# Patient Record
Sex: MALE | Race: WHITE | NOT HISPANIC OR LATINO | Employment: FULL TIME | URBAN - METROPOLITAN AREA
[De-identification: names, ages, dates, MRNs, and addresses within clinical notes are randomized per-mention and may not be internally consistent; named-entity substitution may affect disease eponyms.]

---

## 2017-11-09 ENCOUNTER — IMMUNIZATION (OUTPATIENT)
Dept: OCCUPATIONAL MEDICINE | Facility: CLINIC | Age: 47
End: 2017-11-09

## 2017-11-09 DIAGNOSIS — Z23 NEED FOR VACCINATION: ICD-10-CM

## 2017-11-09 PROCEDURE — 90686 IIV4 VACC NO PRSV 0.5 ML IM: CPT | Performed by: PREVENTIVE MEDICINE

## 2018-10-01 ENCOUNTER — IMMUNIZATION (OUTPATIENT)
Dept: OCCUPATIONAL MEDICINE | Facility: CLINIC | Age: 48
End: 2018-10-01

## 2018-10-01 DIAGNOSIS — Z23 NEED FOR VACCINATION: ICD-10-CM

## 2018-10-01 PROCEDURE — 90686 IIV4 VACC NO PRSV 0.5 ML IM: CPT | Performed by: PREVENTIVE MEDICINE

## 2018-11-20 PROBLEM — M76.899 BICEPS FEMORIS TENDONITIS: Status: ACTIVE | Noted: 2018-11-20

## 2019-02-26 ENCOUNTER — HOSPITAL ENCOUNTER (OUTPATIENT)
Dept: RADIOLOGY | Facility: MEDICAL CENTER | Age: 49
End: 2019-02-26
Attending: PHYSICIAN ASSISTANT
Payer: COMMERCIAL

## 2019-02-26 DIAGNOSIS — M17.11 OSTEOARTHRITIS OF RIGHT KNEE, UNSPECIFIED OSTEOARTHRITIS TYPE: ICD-10-CM

## 2019-02-26 PROCEDURE — 73700 CT LOWER EXTREMITY W/O DYE: CPT | Mod: RT

## 2019-04-23 DIAGNOSIS — Z01.812 PRE-OPERATIVE LABORATORY EXAMINATION: ICD-10-CM

## 2019-04-23 DIAGNOSIS — Z01.810 PRE-OPERATIVE CARDIOVASCULAR EXAMINATION: ICD-10-CM

## 2019-04-23 LAB
BASOPHILS # BLD AUTO: 0.6 % (ref 0–1.8)
BASOPHILS # BLD: 0.03 K/UL (ref 0–0.12)
EKG IMPRESSION: NORMAL
EOSINOPHIL # BLD AUTO: 0.1 K/UL (ref 0–0.51)
EOSINOPHIL NFR BLD: 2 % (ref 0–6.9)
ERYTHROCYTE [DISTWIDTH] IN BLOOD BY AUTOMATED COUNT: 44.1 FL (ref 35.9–50)
HCT VFR BLD AUTO: 45.9 % (ref 42–52)
HGB BLD-MCNC: 15.3 G/DL (ref 14–18)
HIV 1+2 AB+HIV1 P24 AG SERPL QL IA: NON REACTIVE
IMM GRANULOCYTES # BLD AUTO: 0.01 K/UL (ref 0–0.11)
IMM GRANULOCYTES NFR BLD AUTO: 0.2 % (ref 0–0.9)
LYMPHOCYTES # BLD AUTO: 2.34 K/UL (ref 1–4.8)
LYMPHOCYTES NFR BLD: 47.6 % (ref 22–41)
MCH RBC QN AUTO: 30.7 PG (ref 27–33)
MCHC RBC AUTO-ENTMCNC: 33.3 G/DL (ref 33.7–35.3)
MCV RBC AUTO: 92.2 FL (ref 81.4–97.8)
MONOCYTES # BLD AUTO: 0.47 K/UL (ref 0–0.85)
MONOCYTES NFR BLD AUTO: 9.6 % (ref 0–13.4)
NEUTROPHILS # BLD AUTO: 1.97 K/UL (ref 1.82–7.42)
NEUTROPHILS NFR BLD: 40 % (ref 44–72)
NRBC # BLD AUTO: 0 K/UL
NRBC BLD-RTO: 0 /100 WBC
PLATELET # BLD AUTO: 215 K/UL (ref 164–446)
PMV BLD AUTO: 9.8 FL (ref 9–12.9)
RBC # BLD AUTO: 4.98 M/UL (ref 4.7–6.1)
SCCMEC + MECA PNL NOSE NAA+PROBE: NEGATIVE
SCCMEC + MECA PNL NOSE NAA+PROBE: NEGATIVE
WBC # BLD AUTO: 4.9 K/UL (ref 4.8–10.8)

## 2019-04-23 PROCEDURE — 87389 HIV-1 AG W/HIV-1&-2 AB AG IA: CPT

## 2019-04-23 PROCEDURE — 87640 STAPH A DNA AMP PROBE: CPT

## 2019-04-23 PROCEDURE — 85025 COMPLETE CBC W/AUTO DIFF WBC: CPT

## 2019-04-23 PROCEDURE — 80048 BASIC METABOLIC PNL TOTAL CA: CPT

## 2019-04-23 PROCEDURE — 36415 COLL VENOUS BLD VENIPUNCTURE: CPT

## 2019-04-23 PROCEDURE — 93010 ELECTROCARDIOGRAM REPORT: CPT | Performed by: INTERNAL MEDICINE

## 2019-04-23 PROCEDURE — 93005 ELECTROCARDIOGRAM TRACING: CPT

## 2019-04-23 PROCEDURE — 87641 MR-STAPH DNA AMP PROBE: CPT

## 2019-04-23 RX ORDER — IBUPROFEN 200 MG
600 TABLET ORAL EVERY 6 HOURS PRN
COMMUNITY
End: 2020-05-29

## 2019-04-24 NOTE — OR NURSING
BMP not received by lab. Spoke with Dr. Teofilo Medellin MA. She stated it was OK to draw day of surgery.

## 2019-04-25 LAB
ANION GAP SERPL CALC-SCNC: 14 MMOL/L (ref 0–11.9)
BUN SERPL-MCNC: 22 MG/DL (ref 8–22)
CALCIUM SERPL-MCNC: 9.6 MG/DL (ref 8.5–10.5)
CHLORIDE SERPL-SCNC: 104 MMOL/L (ref 96–112)
CO2 SERPL-SCNC: 22 MMOL/L (ref 20–33)
CREAT SERPL-MCNC: 1.1 MG/DL (ref 0.5–1.4)
GLUCOSE SERPL-MCNC: 99 MG/DL (ref 65–99)
POTASSIUM SERPL-SCNC: 4.2 MMOL/L (ref 3.6–5.5)
SODIUM SERPL-SCNC: 140 MMOL/L (ref 135–145)

## 2019-05-09 ENCOUNTER — ANESTHESIA (OUTPATIENT)
Dept: SURGERY | Facility: MEDICAL CENTER | Age: 49
End: 2019-05-09
Payer: COMMERCIAL

## 2019-05-09 ENCOUNTER — ANESTHESIA EVENT (OUTPATIENT)
Dept: SURGERY | Facility: MEDICAL CENTER | Age: 49
End: 2019-05-09
Payer: COMMERCIAL

## 2019-05-09 ENCOUNTER — HOSPITAL ENCOUNTER (OUTPATIENT)
Facility: MEDICAL CENTER | Age: 49
End: 2019-05-09
Attending: ORTHOPAEDIC SURGERY | Admitting: ORTHOPAEDIC SURGERY
Payer: COMMERCIAL

## 2019-05-09 VITALS
BODY MASS INDEX: 31.61 KG/M2 | HEIGHT: 73 IN | WEIGHT: 238.54 LBS | RESPIRATION RATE: 17 BRPM | HEART RATE: 83 BPM | TEMPERATURE: 97.9 F | SYSTOLIC BLOOD PRESSURE: 121 MMHG | OXYGEN SATURATION: 99 % | DIASTOLIC BLOOD PRESSURE: 75 MMHG

## 2019-05-09 DIAGNOSIS — Z96.651 S/P TKR (TOTAL KNEE REPLACEMENT) USING CEMENT, RIGHT: ICD-10-CM

## 2019-05-09 PROCEDURE — A9270 NON-COVERED ITEM OR SERVICE: HCPCS | Performed by: ORTHOPAEDIC SURGERY

## 2019-05-09 PROCEDURE — A9270 NON-COVERED ITEM OR SERVICE: HCPCS | Performed by: STUDENT IN AN ORGANIZED HEALTH CARE EDUCATION/TRAINING PROGRAM

## 2019-05-09 PROCEDURE — 160002 HCHG RECOVERY MINUTES (STAT): Performed by: ORTHOPAEDIC SURGERY

## 2019-05-09 PROCEDURE — 700111 HCHG RX REV CODE 636 W/ 250 OVERRIDE (IP): Performed by: ANESTHESIOLOGY

## 2019-05-09 PROCEDURE — 97161 PT EVAL LOW COMPLEX 20 MIN: CPT

## 2019-05-09 PROCEDURE — 700102 HCHG RX REV CODE 250 W/ 637 OVERRIDE(OP): Performed by: STUDENT IN AN ORGANIZED HEALTH CARE EDUCATION/TRAINING PROGRAM

## 2019-05-09 PROCEDURE — 160048 HCHG OR STATISTICAL LEVEL 1-5: Performed by: ORTHOPAEDIC SURGERY

## 2019-05-09 PROCEDURE — 160009 HCHG ANES TIME/MIN: Performed by: ORTHOPAEDIC SURGERY

## 2019-05-09 PROCEDURE — 502240 HCHG MISC OR SUPPLY RC 0272: Performed by: ORTHOPAEDIC SURGERY

## 2019-05-09 PROCEDURE — 97165 OT EVAL LOW COMPLEX 30 MIN: CPT

## 2019-05-09 PROCEDURE — A9270 NON-COVERED ITEM OR SERVICE: HCPCS | Performed by: ANESTHESIOLOGY

## 2019-05-09 PROCEDURE — 700101 HCHG RX REV CODE 250: Performed by: ORTHOPAEDIC SURGERY

## 2019-05-09 PROCEDURE — 700105 HCHG RX REV CODE 258: Performed by: STUDENT IN AN ORGANIZED HEALTH CARE EDUCATION/TRAINING PROGRAM

## 2019-05-09 PROCEDURE — 160036 HCHG PACU - EA ADDL 30 MINS PHASE I: Performed by: ORTHOPAEDIC SURGERY

## 2019-05-09 PROCEDURE — 700102 HCHG RX REV CODE 250 W/ 637 OVERRIDE(OP): Performed by: ORTHOPAEDIC SURGERY

## 2019-05-09 PROCEDURE — 502000 HCHG MISC OR IMPLANTS RC 0278: Performed by: ORTHOPAEDIC SURGERY

## 2019-05-09 PROCEDURE — 96374 THER/PROPH/DIAG INJ IV PUSH: CPT

## 2019-05-09 PROCEDURE — G0378 HOSPITAL OBSERVATION PER HR: HCPCS

## 2019-05-09 PROCEDURE — 160022 HCHG BLOCK: Performed by: ORTHOPAEDIC SURGERY

## 2019-05-09 PROCEDURE — 160041 HCHG SURGERY MINUTES - EA ADDL 1 MIN LEVEL 4: Performed by: ORTHOPAEDIC SURGERY

## 2019-05-09 PROCEDURE — L8699 PROSTHETIC IMPLANT NOS: HCPCS | Performed by: ORTHOPAEDIC SURGERY

## 2019-05-09 PROCEDURE — 500002 HCHG ADHESIVE, DERMABOND: Performed by: ORTHOPAEDIC SURGERY

## 2019-05-09 PROCEDURE — 700111 HCHG RX REV CODE 636 W/ 250 OVERRIDE (IP): Performed by: STUDENT IN AN ORGANIZED HEALTH CARE EDUCATION/TRAINING PROGRAM

## 2019-05-09 PROCEDURE — 160029 HCHG SURGERY MINUTES - 1ST 30 MINS LEVEL 4: Performed by: ORTHOPAEDIC SURGERY

## 2019-05-09 PROCEDURE — 160035 HCHG PACU - 1ST 60 MINS PHASE I: Performed by: ORTHOPAEDIC SURGERY

## 2019-05-09 PROCEDURE — 700101 HCHG RX REV CODE 250: Performed by: STUDENT IN AN ORGANIZED HEALTH CARE EDUCATION/TRAINING PROGRAM

## 2019-05-09 PROCEDURE — 700105 HCHG RX REV CODE 258: Performed by: ORTHOPAEDIC SURGERY

## 2019-05-09 PROCEDURE — 700105 HCHG RX REV CODE 258: Performed by: ANESTHESIOLOGY

## 2019-05-09 PROCEDURE — 700101 HCHG RX REV CODE 250: Performed by: ANESTHESIOLOGY

## 2019-05-09 PROCEDURE — 502579 HCHG PACK, TOTAL KNEE: Performed by: ORTHOPAEDIC SURGERY

## 2019-05-09 PROCEDURE — 501838 HCHG SUTURE GENERAL: Performed by: ORTHOPAEDIC SURGERY

## 2019-05-09 PROCEDURE — 700102 HCHG RX REV CODE 250 W/ 637 OVERRIDE(OP): Performed by: ANESTHESIOLOGY

## 2019-05-09 DEVICE — BONE CEMENT SIMPLEX FULL DOSE - (10EA/PK): Type: IMPLANTABLE DEVICE | Site: KNEE | Status: FUNCTIONAL

## 2019-05-09 DEVICE — IMPLANTABLE DEVICE: Type: IMPLANTABLE DEVICE | Site: KNEE | Status: FUNCTIONAL

## 2019-05-09 RX ORDER — DIPHENHYDRAMINE HYDROCHLORIDE 50 MG/ML
25 INJECTION INTRAMUSCULAR; INTRAVENOUS EVERY 6 HOURS PRN
Status: DISCONTINUED | OUTPATIENT
Start: 2019-05-09 | End: 2019-05-09 | Stop reason: HOSPADM

## 2019-05-09 RX ORDER — BUPIVACAINE HYDROCHLORIDE 2.5 MG/ML
INJECTION, SOLUTION EPIDURAL; INFILTRATION; INTRACAUDAL PRN
Status: DISCONTINUED | OUTPATIENT
Start: 2019-05-09 | End: 2019-05-09 | Stop reason: SURG

## 2019-05-09 RX ORDER — ACETAMINOPHEN 500 MG
1000 TABLET ORAL EVERY 6 HOURS
Status: DISCONTINUED | OUTPATIENT
Start: 2019-05-09 | End: 2019-05-09 | Stop reason: HOSPADM

## 2019-05-09 RX ORDER — AMOXICILLIN 250 MG
1 CAPSULE ORAL NIGHTLY
Status: DISCONTINUED | OUTPATIENT
Start: 2019-05-09 | End: 2019-05-09 | Stop reason: HOSPADM

## 2019-05-09 RX ORDER — OXYCODONE HYDROCHLORIDE 10 MG/1
10 TABLET ORAL EVERY 4 HOURS PRN
Status: DISCONTINUED | OUTPATIENT
Start: 2019-05-09 | End: 2019-05-09 | Stop reason: HOSPADM

## 2019-05-09 RX ORDER — SCOLOPAMINE TRANSDERMAL SYSTEM 1 MG/1
1 PATCH, EXTENDED RELEASE TRANSDERMAL
Status: DISCONTINUED | OUTPATIENT
Start: 2019-05-09 | End: 2019-05-09 | Stop reason: HOSPADM

## 2019-05-09 RX ORDER — SODIUM CHLORIDE, SODIUM LACTATE, POTASSIUM CHLORIDE, CALCIUM CHLORIDE 600; 310; 30; 20 MG/100ML; MG/100ML; MG/100ML; MG/100ML
INJECTION, SOLUTION INTRAVENOUS CONTINUOUS
Status: DISCONTINUED | OUTPATIENT
Start: 2019-05-09 | End: 2019-05-09 | Stop reason: HOSPADM

## 2019-05-09 RX ORDER — HALOPERIDOL 5 MG/ML
1 INJECTION INTRAMUSCULAR EVERY 6 HOURS PRN
Status: DISCONTINUED | OUTPATIENT
Start: 2019-05-09 | End: 2019-05-09 | Stop reason: HOSPADM

## 2019-05-09 RX ORDER — POLYETHYLENE GLYCOL 3350 17 G/17G
1 POWDER, FOR SOLUTION ORAL 2 TIMES DAILY PRN
Status: DISCONTINUED | OUTPATIENT
Start: 2019-05-09 | End: 2019-05-09 | Stop reason: HOSPADM

## 2019-05-09 RX ORDER — OXYCODONE HYDROCHLORIDE 10 MG/1
10 TABLET ORAL EVERY 4 HOURS PRN
Qty: 50 TAB | Refills: 0 | Status: SHIPPED | OUTPATIENT
Start: 2019-05-09 | End: 2019-05-23

## 2019-05-09 RX ORDER — HYDROMORPHONE HYDROCHLORIDE 1 MG/ML
0.4 INJECTION, SOLUTION INTRAMUSCULAR; INTRAVENOUS; SUBCUTANEOUS
Status: DISCONTINUED | OUTPATIENT
Start: 2019-05-09 | End: 2019-05-09 | Stop reason: HOSPADM

## 2019-05-09 RX ORDER — MAGNESIUM HYDROXIDE 1200 MG/15ML
LIQUID ORAL
Status: COMPLETED | OUTPATIENT
Start: 2019-05-09 | End: 2019-05-09

## 2019-05-09 RX ORDER — ONDANSETRON 2 MG/ML
4 INJECTION INTRAMUSCULAR; INTRAVENOUS EVERY 4 HOURS PRN
Status: DISCONTINUED | OUTPATIENT
Start: 2019-05-09 | End: 2019-05-09 | Stop reason: HOSPADM

## 2019-05-09 RX ORDER — HYDROMORPHONE HYDROCHLORIDE 1 MG/ML
0.5 INJECTION, SOLUTION INTRAMUSCULAR; INTRAVENOUS; SUBCUTANEOUS
Status: DISCONTINUED | OUTPATIENT
Start: 2019-05-09 | End: 2019-05-09 | Stop reason: HOSPADM

## 2019-05-09 RX ORDER — HYDROMORPHONE HYDROCHLORIDE 1 MG/ML
0.2 INJECTION, SOLUTION INTRAMUSCULAR; INTRAVENOUS; SUBCUTANEOUS
Status: DISCONTINUED | OUTPATIENT
Start: 2019-05-09 | End: 2019-05-09 | Stop reason: HOSPADM

## 2019-05-09 RX ORDER — BUPIVACAINE HYDROCHLORIDE AND EPINEPHRINE 2.5; 5 MG/ML; UG/ML
INJECTION, SOLUTION INFILTRATION; PERINEURAL
Status: DISCONTINUED | OUTPATIENT
Start: 2019-05-09 | End: 2019-05-09 | Stop reason: HOSPADM

## 2019-05-09 RX ORDER — HYDROMORPHONE HYDROCHLORIDE 1 MG/ML
0.1 INJECTION, SOLUTION INTRAMUSCULAR; INTRAVENOUS; SUBCUTANEOUS
Status: DISCONTINUED | OUTPATIENT
Start: 2019-05-09 | End: 2019-05-09 | Stop reason: HOSPADM

## 2019-05-09 RX ORDER — DEXTROSE, SODIUM CHLORIDE, SODIUM LACTATE, POTASSIUM CHLORIDE, AND CALCIUM CHLORIDE 5; .6; .31; .03; .02 G/100ML; G/100ML; G/100ML; G/100ML; G/100ML
INJECTION, SOLUTION INTRAVENOUS CONTINUOUS
Status: DISPENSED | OUTPATIENT
Start: 2019-05-09 | End: 2019-05-09

## 2019-05-09 RX ORDER — TAMSULOSIN HYDROCHLORIDE 0.4 MG/1
0.4 CAPSULE ORAL
Status: DISCONTINUED | OUTPATIENT
Start: 2019-05-09 | End: 2019-05-09 | Stop reason: HOSPADM

## 2019-05-09 RX ORDER — ACETAMINOPHEN 650 MG
TABLET, EXTENDED RELEASE ORAL
Status: DISCONTINUED | OUTPATIENT
Start: 2019-05-09 | End: 2019-05-09 | Stop reason: HOSPADM

## 2019-05-09 RX ORDER — HYDROMORPHONE HYDROCHLORIDE 2 MG/ML
INJECTION, SOLUTION INTRAMUSCULAR; INTRAVENOUS; SUBCUTANEOUS PRN
Status: DISCONTINUED | OUTPATIENT
Start: 2019-05-09 | End: 2019-05-09 | Stop reason: SURG

## 2019-05-09 RX ORDER — AMOXICILLIN 250 MG
1 CAPSULE ORAL
Status: DISCONTINUED | OUTPATIENT
Start: 2019-05-09 | End: 2019-05-09 | Stop reason: HOSPADM

## 2019-05-09 RX ORDER — CELECOXIB 200 MG/1
200 CAPSULE ORAL 2 TIMES DAILY
Status: DISCONTINUED | OUTPATIENT
Start: 2019-05-10 | End: 2019-05-09 | Stop reason: HOSPADM

## 2019-05-09 RX ORDER — MEPERIDINE HYDROCHLORIDE 25 MG/ML
12.5 INJECTION INTRAMUSCULAR; INTRAVENOUS; SUBCUTANEOUS
Status: DISCONTINUED | OUTPATIENT
Start: 2019-05-09 | End: 2019-05-09 | Stop reason: HOSPADM

## 2019-05-09 RX ORDER — ENEMA 19; 7 G/133ML; G/133ML
1 ENEMA RECTAL
Status: DISCONTINUED | OUTPATIENT
Start: 2019-05-09 | End: 2019-05-09 | Stop reason: HOSPADM

## 2019-05-09 RX ORDER — CHLORPROMAZINE HYDROCHLORIDE 25 MG/ML
25 INJECTION INTRAMUSCULAR EVERY 6 HOURS PRN
Status: DISCONTINUED | OUTPATIENT
Start: 2019-05-09 | End: 2019-05-09 | Stop reason: HOSPADM

## 2019-05-09 RX ORDER — ONDANSETRON 2 MG/ML
4 INJECTION INTRAMUSCULAR; INTRAVENOUS
Status: DISCONTINUED | OUTPATIENT
Start: 2019-05-09 | End: 2019-05-09 | Stop reason: HOSPADM

## 2019-05-09 RX ORDER — DEXAMETHASONE SODIUM PHOSPHATE 4 MG/ML
4 INJECTION, SOLUTION INTRA-ARTICULAR; INTRALESIONAL; INTRAMUSCULAR; INTRAVENOUS; SOFT TISSUE
Status: DISCONTINUED | OUTPATIENT
Start: 2019-05-09 | End: 2019-05-09 | Stop reason: HOSPADM

## 2019-05-09 RX ORDER — TRAMADOL HYDROCHLORIDE 50 MG/1
50 TABLET ORAL EVERY 4 HOURS PRN
Status: DISCONTINUED | OUTPATIENT
Start: 2019-05-09 | End: 2019-05-09 | Stop reason: HOSPADM

## 2019-05-09 RX ORDER — DEXAMETHASONE SODIUM PHOSPHATE 4 MG/ML
4 INJECTION, SOLUTION INTRA-ARTICULAR; INTRALESIONAL; INTRAMUSCULAR; INTRAVENOUS; SOFT TISSUE ONCE
Status: DISCONTINUED | OUTPATIENT
Start: 2019-05-10 | End: 2019-05-09 | Stop reason: HOSPADM

## 2019-05-09 RX ORDER — CEFAZOLIN SODIUM 1 G/3ML
INJECTION, POWDER, FOR SOLUTION INTRAMUSCULAR; INTRAVENOUS PRN
Status: DISCONTINUED | OUTPATIENT
Start: 2019-05-09 | End: 2019-05-09 | Stop reason: SURG

## 2019-05-09 RX ORDER — ONDANSETRON 4 MG/1
8 TABLET, ORALLY DISINTEGRATING ORAL EVERY 8 HOURS PRN
Status: DISCONTINUED | OUTPATIENT
Start: 2019-05-09 | End: 2019-05-09 | Stop reason: HOSPADM

## 2019-05-09 RX ORDER — HYDRALAZINE HYDROCHLORIDE 20 MG/ML
5 INJECTION INTRAMUSCULAR; INTRAVENOUS
Status: DISCONTINUED | OUTPATIENT
Start: 2019-05-09 | End: 2019-05-09 | Stop reason: HOSPADM

## 2019-05-09 RX ORDER — CELECOXIB 200 MG/1
200 CAPSULE ORAL
Status: COMPLETED | OUTPATIENT
Start: 2019-05-09 | End: 2019-05-09

## 2019-05-09 RX ORDER — ZOLPIDEM TARTRATE 5 MG/1
5 TABLET ORAL NIGHTLY PRN
Status: DISCONTINUED | OUTPATIENT
Start: 2019-05-10 | End: 2019-05-09 | Stop reason: HOSPADM

## 2019-05-09 RX ORDER — OXYCODONE HCL 5 MG/5 ML
10 SOLUTION, ORAL ORAL
Status: COMPLETED | OUTPATIENT
Start: 2019-05-09 | End: 2019-05-09

## 2019-05-09 RX ORDER — KETOROLAC TROMETHAMINE 30 MG/ML
30 INJECTION, SOLUTION INTRAMUSCULAR; INTRAVENOUS EVERY 6 HOURS
Status: DISCONTINUED | OUTPATIENT
Start: 2019-05-09 | End: 2019-05-09 | Stop reason: HOSPADM

## 2019-05-09 RX ORDER — HALOPERIDOL 5 MG/ML
1 INJECTION INTRAMUSCULAR
Status: DISCONTINUED | OUTPATIENT
Start: 2019-05-09 | End: 2019-05-09 | Stop reason: HOSPADM

## 2019-05-09 RX ORDER — DIAZEPAM 5 MG/1
5 TABLET ORAL EVERY 6 HOURS PRN
Status: DISCONTINUED | OUTPATIENT
Start: 2019-05-09 | End: 2019-05-09 | Stop reason: HOSPADM

## 2019-05-09 RX ORDER — DOCUSATE SODIUM 100 MG/1
100 CAPSULE, LIQUID FILLED ORAL 2 TIMES DAILY
Status: DISCONTINUED | OUTPATIENT
Start: 2019-05-09 | End: 2019-05-09 | Stop reason: HOSPADM

## 2019-05-09 RX ORDER — ACETAMINOPHEN 500 MG
500 TABLET ORAL
Status: COMPLETED | OUTPATIENT
Start: 2019-05-09 | End: 2019-05-09

## 2019-05-09 RX ORDER — GABAPENTIN 300 MG/1
600 CAPSULE ORAL
Status: COMPLETED | OUTPATIENT
Start: 2019-05-09 | End: 2019-05-09

## 2019-05-09 RX ORDER — PSEUDOEPHEDRINE HCL 30 MG
100 TABLET ORAL 2 TIMES DAILY
Qty: 60 CAP | Refills: 0 | Status: SHIPPED | OUTPATIENT
Start: 2019-05-09 | End: 2020-05-29

## 2019-05-09 RX ORDER — CHLORPROMAZINE HYDROCHLORIDE 10 MG/1
25 TABLET, FILM COATED ORAL EVERY 6 HOURS PRN
Status: DISCONTINUED | OUTPATIENT
Start: 2019-05-09 | End: 2019-05-09 | Stop reason: HOSPADM

## 2019-05-09 RX ORDER — DIPHENHYDRAMINE HCL 25 MG
25 TABLET ORAL EVERY 6 HOURS PRN
Status: DISCONTINUED | OUTPATIENT
Start: 2019-05-09 | End: 2019-05-09 | Stop reason: HOSPADM

## 2019-05-09 RX ORDER — OXYCODONE HCL 5 MG/5 ML
5 SOLUTION, ORAL ORAL
Status: COMPLETED | OUTPATIENT
Start: 2019-05-09 | End: 2019-05-09

## 2019-05-09 RX ORDER — OXYCODONE HYDROCHLORIDE 5 MG/1
5 TABLET ORAL EVERY 4 HOURS PRN
Status: DISCONTINUED | OUTPATIENT
Start: 2019-05-09 | End: 2019-05-09 | Stop reason: HOSPADM

## 2019-05-09 RX ORDER — CEFAZOLIN SODIUM 2 G/100ML
2 INJECTION, SOLUTION INTRAVENOUS
Status: DISCONTINUED | OUTPATIENT
Start: 2019-05-09 | End: 2019-05-09 | Stop reason: HOSPADM

## 2019-05-09 RX ORDER — BISACODYL 10 MG
10 SUPPOSITORY, RECTAL RECTAL
Status: DISCONTINUED | OUTPATIENT
Start: 2019-05-09 | End: 2019-05-09 | Stop reason: HOSPADM

## 2019-05-09 RX ORDER — DIPHENHYDRAMINE HYDROCHLORIDE 50 MG/ML
12.5 INJECTION INTRAMUSCULAR; INTRAVENOUS
Status: DISCONTINUED | OUTPATIENT
Start: 2019-05-09 | End: 2019-05-09 | Stop reason: HOSPADM

## 2019-05-09 RX ORDER — DEXAMETHASONE SODIUM PHOSPHATE 4 MG/ML
INJECTION, SOLUTION INTRA-ARTICULAR; INTRALESIONAL; INTRAMUSCULAR; INTRAVENOUS; SOFT TISSUE PRN
Status: DISCONTINUED | OUTPATIENT
Start: 2019-05-09 | End: 2019-05-09 | Stop reason: SURG

## 2019-05-09 RX ORDER — CEFAZOLIN SODIUM 2 G/100ML
2 INJECTION, SOLUTION INTRAVENOUS EVERY 8 HOURS
Status: DISCONTINUED | OUTPATIENT
Start: 2019-05-09 | End: 2019-05-09 | Stop reason: HOSPADM

## 2019-05-09 RX ORDER — KETOROLAC TROMETHAMINE 30 MG/ML
INJECTION, SOLUTION INTRAMUSCULAR; INTRAVENOUS
Status: DISCONTINUED | OUTPATIENT
Start: 2019-05-09 | End: 2019-05-09 | Stop reason: HOSPADM

## 2019-05-09 RX ORDER — SODIUM CHLORIDE, SODIUM LACTATE, POTASSIUM CHLORIDE, CALCIUM CHLORIDE 600; 310; 30; 20 MG/100ML; MG/100ML; MG/100ML; MG/100ML
INJECTION, SOLUTION INTRAVENOUS
Status: DISCONTINUED | OUTPATIENT
Start: 2019-05-09 | End: 2019-05-09 | Stop reason: SURG

## 2019-05-09 RX ADMIN — FENTANYL CITRATE 50 MCG: 50 INJECTION, SOLUTION INTRAMUSCULAR; INTRAVENOUS at 12:10

## 2019-05-09 RX ADMIN — OXYCODONE HYDROCHLORIDE 10 MG: 10 TABLET ORAL at 15:42

## 2019-05-09 RX ADMIN — BUPIVACAINE HYDROCHLORIDE 15 ML: 2.5 INJECTION, SOLUTION EPIDURAL; INFILTRATION; INTRACAUDAL; PERINEURAL at 09:07

## 2019-05-09 RX ADMIN — TRANEXAMIC ACID 2000 MG: 100 INJECTION, SOLUTION INTRAVENOUS at 11:28

## 2019-05-09 RX ADMIN — LIDOCAINE HYDROCHLORIDE 0.3 ML: 10 INJECTION, SOLUTION EPIDURAL; INFILTRATION; INTRACAUDAL at 07:49

## 2019-05-09 RX ADMIN — HYDROMORPHONE HYDROCHLORIDE 0.5 MG: 2 INJECTION, SOLUTION INTRAMUSCULAR; INTRAVENOUS; SUBCUTANEOUS at 10:30

## 2019-05-09 RX ADMIN — FENTANYL CITRATE 50 MCG: 50 INJECTION, SOLUTION INTRAMUSCULAR; INTRAVENOUS at 11:23

## 2019-05-09 RX ADMIN — DEXAMETHASONE SODIUM PHOSPHATE 8 MG: 4 INJECTION, SOLUTION INTRA-ARTICULAR; INTRALESIONAL; INTRAMUSCULAR; INTRAVENOUS; SOFT TISSUE at 09:30

## 2019-05-09 RX ADMIN — ACETAMINOPHEN 500 MG: 500 TABLET ORAL at 07:48

## 2019-05-09 RX ADMIN — TAMSULOSIN HYDROCHLORIDE 0.4 MG: 0.4 CAPSULE ORAL at 13:39

## 2019-05-09 RX ADMIN — SODIUM CHLORIDE, SODIUM LACTATE, POTASSIUM CHLORIDE, CALCIUM CHLORIDE AND DEXTROSE MONOHYDRATE: 5; 600; 310; 30; 20 INJECTION, SOLUTION INTRAVENOUS at 11:24

## 2019-05-09 RX ADMIN — PROPOFOL 200 MG: 10 INJECTION, EMULSION INTRAVENOUS at 09:30

## 2019-05-09 RX ADMIN — CELECOXIB 200 MG: 200 CAPSULE ORAL at 07:49

## 2019-05-09 RX ADMIN — FENTANYL CITRATE 50 MCG: 50 INJECTION, SOLUTION INTRAMUSCULAR; INTRAVENOUS at 09:55

## 2019-05-09 RX ADMIN — HYDROMORPHONE HYDROCHLORIDE 0.5 MG: 2 INJECTION, SOLUTION INTRAMUSCULAR; INTRAVENOUS; SUBCUTANEOUS at 10:15

## 2019-05-09 RX ADMIN — FENTANYL CITRATE 100 MCG: 50 INJECTION, SOLUTION INTRAMUSCULAR; INTRAVENOUS at 09:45

## 2019-05-09 RX ADMIN — SODIUM CHLORIDE, POTASSIUM CHLORIDE, SODIUM LACTATE AND CALCIUM CHLORIDE: 600; 310; 30; 20 INJECTION, SOLUTION INTRAVENOUS at 09:26

## 2019-05-09 RX ADMIN — HYDROMORPHONE HYDROCHLORIDE 1 MG: 2 INJECTION, SOLUTION INTRAMUSCULAR; INTRAVENOUS; SUBCUTANEOUS at 09:50

## 2019-05-09 RX ADMIN — SODIUM CHLORIDE, POTASSIUM CHLORIDE, SODIUM LACTATE AND CALCIUM CHLORIDE: 600; 310; 30; 20 INJECTION, SOLUTION INTRAVENOUS at 07:49

## 2019-05-09 RX ADMIN — ACETAMINOPHEN 1000 MG: 500 TABLET ORAL at 13:39

## 2019-05-09 RX ADMIN — FENTANYL CITRATE 50 MCG: 50 INJECTION, SOLUTION INTRAMUSCULAR; INTRAVENOUS at 10:10

## 2019-05-09 RX ADMIN — GABAPENTIN 600 MG: 300 CAPSULE ORAL at 07:49

## 2019-05-09 RX ADMIN — OXYCODONE HYDROCHLORIDE 10 MG: 5 SOLUTION ORAL at 11:19

## 2019-05-09 RX ADMIN — HYDRALAZINE HYDROCHLORIDE 5 MG: 20 INJECTION INTRAMUSCULAR; INTRAVENOUS at 11:49

## 2019-05-09 RX ADMIN — FENTANYL CITRATE 50 MCG: 50 INJECTION, SOLUTION INTRAMUSCULAR; INTRAVENOUS at 10:25

## 2019-05-09 RX ADMIN — CEFAZOLIN 2 G: 330 INJECTION, POWDER, FOR SOLUTION INTRAMUSCULAR; INTRAVENOUS at 09:26

## 2019-05-09 RX ADMIN — KETOROLAC TROMETHAMINE 30 MG: 30 INJECTION, SOLUTION INTRAMUSCULAR at 13:40

## 2019-05-09 ASSESSMENT — LIFESTYLE VARIABLES
ON A TYPICAL DAY WHEN YOU DRINK ALCOHOL HOW MANY DRINKS DO YOU HAVE: 2
HOW MANY TIMES IN THE PAST YEAR HAVE YOU HAD 5 OR MORE DRINKS IN A DAY: 0
HAVE PEOPLE ANNOYED YOU BY CRITICIZING YOUR DRINKING: NO
CONSUMPTION TOTAL: NEGATIVE
EVER FELT BAD OR GUILTY ABOUT YOUR DRINKING: NO
EVER HAD A DRINK FIRST THING IN THE MORNING TO STEADY YOUR NERVES TO GET RID OF A HANGOVER: NO
EVER_SMOKED: NEVER
TOTAL SCORE: 0
HAVE YOU EVER FELT YOU SHOULD CUT DOWN ON YOUR DRINKING: NO
ALCOHOL_USE: YES
EVER_SMOKED: NEVER
AVERAGE NUMBER OF DAYS PER WEEK YOU HAVE A DRINK CONTAINING ALCOHOL: 2
TOTAL SCORE: 0
TOTAL SCORE: 0

## 2019-05-09 ASSESSMENT — COGNITIVE AND FUNCTIONAL STATUS - GENERAL
SUGGESTED CMS G CODE MODIFIER MOBILITY: CK
DAILY ACTIVITIY SCORE: 23
DAILY ACTIVITIY SCORE: 20
STANDING UP FROM CHAIR USING ARMS: A LITTLE
MOBILITY SCORE: 18
HELP NEEDED FOR BATHING: A LITTLE
SUGGESTED CMS G CODE MODIFIER MOBILITY: CK
MOVING FROM LYING ON BACK TO SITTING ON SIDE OF FLAT BED: A LITTLE
CLIMB 3 TO 5 STEPS WITH RAILING: A LITTLE
TOILETING: A LITTLE
STANDING UP FROM CHAIR USING ARMS: A LITTLE
MOVING TO AND FROM BED TO CHAIR: A LITTLE
TURNING FROM BACK TO SIDE WHILE IN FLAT BAD: A LITTLE
TURNING FROM BACK TO SIDE WHILE IN FLAT BAD: A LITTLE
DRESSING REGULAR UPPER BODY CLOTHING: A LITTLE
SUGGESTED CMS G CODE MODIFIER DAILY ACTIVITY: CI
WALKING IN HOSPITAL ROOM: A LITTLE
DRESSING REGULAR LOWER BODY CLOTHING: A LITTLE
CLIMB 3 TO 5 STEPS WITH RAILING: A LITTLE
HELP NEEDED FOR BATHING: A LITTLE
SUGGESTED CMS G CODE MODIFIER DAILY ACTIVITY: CJ
WALKING IN HOSPITAL ROOM: A LITTLE
MOBILITY SCORE: 18
MOVING TO AND FROM BED TO CHAIR: A LITTLE
MOVING FROM LYING ON BACK TO SITTING ON SIDE OF FLAT BED: A LITTLE

## 2019-05-09 ASSESSMENT — COPD QUESTIONNAIRES
DURING THE PAST 4 WEEKS HOW MUCH DID YOU FEEL SHORT OF BREATH: NONE/LITTLE OF THE TIME
HAVE YOU SMOKED AT LEAST 100 CIGARETTES IN YOUR ENTIRE LIFE: NO/DON'T KNOW

## 2019-05-09 ASSESSMENT — GAIT ASSESSMENTS
DISTANCE (FEET): 150
DEVIATION: DECREASED HEEL STRIKE;DECREASED TOE OFF
GAIT LEVEL OF ASSIST: SUPERVISED
ASSISTIVE DEVICE: FRONT WHEEL WALKER

## 2019-05-09 ASSESSMENT — ACTIVITIES OF DAILY LIVING (ADL): TOILETING: INDEPENDENT

## 2019-05-09 ASSESSMENT — PATIENT HEALTH QUESTIONNAIRE - PHQ9
2. FEELING DOWN, DEPRESSED, IRRITABLE, OR HOPELESS: NOT AT ALL
SUM OF ALL RESPONSES TO PHQ9 QUESTIONS 1 AND 2: 0
1. LITTLE INTEREST OR PLEASURE IN DOING THINGS: NOT AT ALL

## 2019-05-09 ASSESSMENT — PAIN SCALES - GENERAL: PAIN_LEVEL: 3

## 2019-05-09 NOTE — ANESTHESIA POSTPROCEDURE EVALUATION
Patient: Robby Nava Jr., MD    Procedure Summary     Date:  05/09/19 Room / Location:  [place] [address] / SURGERY [facility]    Anesthesia Start:  0926 Anesthesia Stop:  1058    Procedure:  KNEE ARTHROPLASTY TOTAL (Right Knee) Diagnosis:  (UNILATERAL PRIMARY OA RIGHT KNEE, PAIN IN RIGHT KNEE )    Surgeon:  Greg Red M.D. Responsible Provider:  Tobey Gansert, M.D.    Anesthesia Type:  general, peripheral nerve block ASA Status:  1          Final Anesthesia Type: general, peripheral nerve block  Last vitals  BP   NIBP: 133/106    Temp   36.3 °C (97.4 °F)    Pulse   Pulse: 63, Heart Rate (Monitored): 63   Resp   (!) 9    SpO2   100 %      Anesthesia Post Evaluation    Patient location during evaluation: PACU  Patient participation: complete - patient participated  Level of consciousness: awake and alert  Pain score: 3    Airway patency: patent  Anesthetic complications: no  Cardiovascular status: hemodynamically stable  Respiratory status: acceptable  Hydration status: euvolemic    PONV: none           Nurse Pain Score: 3 (NPRS)

## 2019-05-09 NOTE — THERAPY
"Physical Therapy Evaluation completed.   Bed Mobility:  Supine to Sit: Supervised  Transfers: Sit to Stand: Supervised  Gait: Level Of Assist: Supervised with Front-Wheel Walker       Plan of Care: Patient with no further skilled PT needs in the acute care setting at this time  Discharge Recommendations: Equipment: Front-Wheel Walker. Post-acute therapy Recommend outpatient for continued physical therapy services.    Mr. Nava 48 y/o male who presents to acute secondary to R TKA. He presents with mild R knee ROM and strength deficits that result in dynamic balance impairments and altered gait pattern. FWW remedied balance deficits. Cues for heel toe steps to normalize gait pattern. Stair sequencing training provided. Pt able to perform gait, transfers, bed mobility, and stairs without physical assist. Provided with TKA therex handouts and reviewed. Pt demonstrated good understanding. No additional acute physical therapy needs. Recommend outpatient PT to address ROM and strength deficits.     See \"Rehab Therapy-Acute\" Patient Summary Report for complete documentation.     "

## 2019-05-09 NOTE — ANESTHESIA TIME REPORT
Anesthesia Start and Stop Event Times     Date Time Event    5/9/2019 0926 Anesthesia Start     1058 Anesthesia Stop        Responsible Staff  05/09/19    Name Role Begin End    Tobey Gansert, M.D. Anesth 0926 1058        Preop Diagnosis (Free Text):  Pre-op Diagnosis     UNILATERAL PRIMARY OA RIGHT KNEE, PAIN IN RIGHT KNEE         Preop Diagnosis (Codes):  Diagnosis Information     Diagnosis Code(s):         Post op Diagnosis  Knee osteoarthritis      Premium Reason  Non-Premium    Comments:

## 2019-05-09 NOTE — ANESTHESIA PREPROCEDURE EVALUATION
Relevant Problems   No relevant active problems       Physical Exam    Airway   Mallampati: II  TM distance: >3 FB  Neck ROM: full       Cardiovascular - normal exam  Rhythm: regular  Rate: normal  (-) murmur     Dental - normal exam         Pulmonary - normal exam  Breath sounds clear to auscultation     Abdominal    Neurological - normal exam                 Anesthesia Plan    ASA 1       Plan - general and peripheral nerve block     Peripheral nerve block will be post-op pain control  Airway plan will be LMA        Induction: intravenous    Postoperative Plan: Postoperative administration of opioids is intended.    Pertinent diagnostic labs and testing reviewed    Informed Consent:    Anesthetic plan and risks discussed with patient.    Use of blood products discussed with: patient whom consented to blood products.

## 2019-05-09 NOTE — ANESTHESIA QCDR
2019 [facility] Clinical Data Registry (for Quality Improvement)     Postoperative nausea/vomiting risk protocol (Adult = 18 yrs and Pediatric 3-17 yrs)- (430 and 463)  General inhalation anesthetic (NOT TIVA) with PONV risk factors: Yes  Provision of anti-emetic therapy with at least 2 different classes of agents: Yes   Patient DID NOT receive anti-emetic therapy and reason is documented in Medical Record:  N/A    Multimodal Pain Management- (AQI59)  Patient undergoing Elective Surgery (i.e. Outpatient, or ASC, or Prescheduled Surgery prior to Hospital Admission): Yes  Use of Multimodal Pain Management, two or more drugs and/or interventions, NOT including systemic opioids: Yes   Exception: Documented allergy to multiple classes of analgesics:  N/A    PACU assessment of acute postoperative pain prior to Anesthesia Care End- Applies to Patients Age = 18- (ABG7)  Initial PACU pain score is which of the following: < 7/10  Patient unable to report pain score: N/A    Post-anesthetic transfer of care checklist/protocol to PACU/ICU- (426 and 427)  Upon conclusion of case, patient transferred to which of the following locations: PACU/Non-ICU  Use of transfer checklist/protocol: Yes  Exclusion: Service Performed in Patient Hospital Room (and thus did not require transfer): N/A    PACU Reintubation- (AQI31)  General anesthesia requiring endotracheal intubation (ETT) along with subsequent extubation in OR or PACU: No  Required reintubation in the PACU: N/A  Extubation was a planned trial documented in the medical record prior to removal of the original airway device: N/A    Unplanned admission to ICU related to anesthesia service up through end of PACU care- (MD51)  Unplanned admission to ICU (not initially anticipated at anesthesia start time): No

## 2019-05-09 NOTE — CARE PLAN
Problem: Pain Management  Goal: Pain level will decrease to patient's comfort goal    Intervention: Follow pain managment plan developed in collaboration with patient and Interdisciplinary Team  Educate patient on 0-10 pain scale and medicate per prn orders.

## 2019-05-09 NOTE — PROGRESS NOTES
Patient to room 311 bed 2 from PACU. Awake and oriented times 4. Dressing to right knee dry and intact. No c/o pain or nausea. Tolerating liquids, diet advanced to regular. Wife at bedside. Oxygen on at 10L via mask via ERAS protocol.

## 2019-05-09 NOTE — OR NURSING
Pt is awake and alert and following commands. Pain is decreasing with pain medication and pt denies nausea. Pt has been educated on plan of care and all questions have been answered.     1150: Pt diastolic BP elevated to 110s. MD Gansert called to update. Order received. See MAR.     1230: pt BP responded to medication and pt pain has decreased. Pt resting comfortably in bed. Pt tolerating water and ginger ale without issue. Dressing to right knee is CDI. Pt ankle elevated with pillow and feet are warm and pink. Pt wife called and updated. Will continue to monitor.

## 2019-05-09 NOTE — OP REPORT
DIAGNOSIS: right knee osteoarthritis.    PROCEDURE: Total knee arthroplasty, right side.    ANESTHESIA: General.    COMPLICATIONS: None.    SURGEON: Greg Red MD    ASSISTANT: Kenyon Mcclellan    INDICATIONS: This is a patient with severe pain secondary to osteoarthritis having failed conservative treatments.    DESCRIPTION OF PROCEDURE: Patient was identified in the preop area, site was   marked, taken back to the operating room and underwent general anesthesia.   The right lower extremity was prepped and draped in sterile manner.   Preoperative timeout was held and antibiotics were given. Incision was made   over the anterior knee, soft tissue was dissected down to the fascia. The   fascia was split in medial parapatellar approach. The step drill was placed,   cut was made at 6 degrees and then a femoral cut guide was placed. All cuts   were made for the Conformis. The tibia was cut with the natural slope. The   patella was cut and sized to a 38. All the trials were removed and then the   Conformis femur, tibia and 38 patellar button were all cemented into  place. Final trialing showed that a 7 and B poly provided stability throughout   the entire arc of motion and 7 and B poly was placed. The wound was soaked with   dilute Betadine solution and was injected with Marcaine. Vicryl was used for   the fascia, Monocryl, soft tissue, skin and Dermabond for the final skin   layer. Patient was woken up, taken back to PACU, will be weightbear as   tolerated.

## 2019-05-09 NOTE — ANESTHESIA PROCEDURE NOTES
Airway  Date/Time: 5/9/2019 9:31 AM  Performed by: GANSERT, TOBEY B  Authorized by: GANSERT, TOBEY B     Location:  OR  Urgency:  Elective  Indications for Airway Management:  Anesthesia  Spontaneous Ventilation: absent    Sedation Level:  Deep  Preoxygenated: Yes    Final Airway Type:  Supraglottic airway  Final Supraglottic Airway:  Standard LMA  SGA Size:  5  Number of Attempts at Approach:  1

## 2019-05-09 NOTE — ANESTHESIA PROCEDURE NOTES
Peripheral Block  Performed by: GANSERT, TOBEY B  Authorized by: GANSERT, TOBEY B     Patient Location:  Pre-op  Start Time:  5/9/2019 9:08 AM  End Time:  5/9/2019 7:03 PM  Reason for Block: at surgeon's request and post-op pain management    patient identified, IV checked, site marked, risks and benefits discussed, surgical consent, monitors and equipment checked, pre-op evaluation and timeout performed    Patient Position:  Supine  Prep: ChloraPrep    Monitoring:  Heart rate, continuous pulse ox and cardiac monitor  Block Region:  Lower Extremity  Lower Extremity - Block Type:  Selective FEMORAL nerve block at the Adductor Canal    Laterality:  Right  Procedures: ultrasound guided  Image captured, interpreted and electronically stored.  Local Infiltration:  Lidocaine  Strength:  1 %  Dose:  3 ml  Block Type:  Single-shot  Needle Length:  100mm  Needle Gauge:  21 G  Needle Localization:  Ultrasound guidance  Injection Assessment:  Negative aspiration for heme, no paresthesia on injection, incremental injection and local visualized surrounding nerve on ultrasound  Evidence of intravascular injection: No     US Guided Selective Femoral Nerve Block at Adductor Canal:   US probe placed at mid-thigh level on externally rotated leg and femur identified.  Probe directed medially until Sartorius Muscle (SM), Femoral Artery (FA) and Saphenous Nerve (SN) identified in Adductor Canal (AC).  Needle inserted anterolateral to probe in an in plane approach into a subsartorial perivascular perineural position.  After negative aspiration LA injected with ease and visualized spreading within the AC.

## 2019-05-10 NOTE — PROGRESS NOTES
Patient's IV discontinued, prescriptions, and discharge instructions given. Patient discharged home with wife via wheelchair.

## 2019-05-10 NOTE — DISCHARGE INSTRUCTIONS
Discharge Instructions    Discharged to home by car with relative. Discharged via wheelchair, hospital escort: Yes.  Special equipment needed: Not Applicable    Be sure to schedule a follow-up appointment with your primary care doctor or any specialists as instructed.     Discharge Plan:   Diet Plan: Discussed  Activity Level: Discussed  Confirmed Follow up Appointment: Patient to Call and Schedule Appointment  Confirmed Symptoms Management: Discussed  Medication Reconciliation Updated: Yes  Influenza Vaccine Indication: Not indicated: Previously immunized this influenza season and > 8 years of age    I understand that a diet low in cholesterol, fat, and sodium is recommended for good health. Unless I have been given specific instructions below for another diet, I accept this instruction as my diet prescription.   Other diet: regular    Special Instructions: Discharge instructions for the Orthopedic Patient    Follow up with Primary Care Physician within 2 weeks of discharge to home, regarding:  Review of medications and diagnostic testing.  Surveillance for medical complications.  Workup and treatment of osteoporosis, if appropriate.     -Is this a Joint Replacement patient? Yes Total Joint Knee Replacement Discharge Instructions    Pain  - The goal is to slowly wean off the prescription pain medicine.  - Ice can be used for pain control.  20 minutes at a time is recommended, and never directly against your skin or incision.  - Most patients are off the pain pills by 3 weeks; others may require a low level of pain medications for many months.  If your pain continues to be severe, follow up with your physician.  Infection    Knee joint infections; occur in fewer than 2% of patients. The most common causes of infection following total knee replacement surgery are from bacteria that enter the bloodstream during dental procedures, urinary tract infections, or skin infections. These bacteria can lodge around your knee  replacement and cause an infection.  - Keep the incision as clean and dry as possible.  - Always wash your hands before touching your incision.  - Skin infections tend to develop around 7-10 days after surgery; most can be treated with oral antibiotics.  - Dental Care should be delayed for 3 months after surgery, your surgeon recommends taking a dose of antibiotics 1 hour prior to any dental procedure. After 2 years, most surgeons recommend antibiotics only before an extensive procedure.  Ask your surgeon what he recommends.  - Signs and symptoms of infection can include:  low grade fever, redness, pain, swelling and drainage from your incision.  Notify your surgeon immediately if you develop any of these symptoms.  Other instructions  - Bowel habits - constipation is extremely common and is caused by a combination of anesthesia, lack of mobility and pain medicine.  Use stool softeners or laxatives if necessary. It is important not to ignore this problem, as bowel obstructions can be a serious complication after joint replacement surgery.  - Mood/Energy Level - Many patients experience a lack of energy and endurance for up to 2-3 months after surgery.  Some may also feel down and can even become depressed.  This is likely due to the postoperative anemia, change in activity level, lack of sleep, pain medicine and just the emotional reaction to the surgery itself that is a big disruption in a person’s life.  This usually passes.  If symptoms persist, follow up with your primary physician.  - Returning to work - Your surgeon will give you more specific instructions. Depending on the type of activities you perform, it may be 6 to 8 weeks before you return to work.   Generally, if you work a sedentary job requiring little standing or walking, most patients may return within 2-6 weeks.  Manual labor jobs involving walking, lifting and standing may take longer. Your surgeon’s office can provide a release to part-time or  light duty work early on in your recovery and progress you to full duty as able.    - Driving - If your left knee was replaced and you have an automatic transmission, you may be able to begin driving in a week or so, provided you are no longer taking narcotic pain medication. If your right knee was replaced, avoid driving for 6 to 8 weeks. Remember that your reflexes may not be as sharp as before your surgery. Ask your surgeon for specific instructions.   - Avoiding falls - A fall during the first few weeks after surgery can damage your new knee and may result in a need for further surgery.   throw rugs and tack down loose carpeting.  Be aware of floor hazards such as pets, small objects or uneven surfaces.    - [employer] Metal Detectors - The sensitivity of metal detectors varies and it is likely that your prosthesis will cause an alarm.  Inform the  of your artificial joint.  Diet  - Resume your normal diet as tolerated.  - It is important to achieve a healthy nutritional status by eating a well balanced diet on a regular basis.  - Your physician may recommend that you take iron and vitamin supplements.   - Continue to drink plenty of fluids.  Shower/Bathing  - You may shower as soon as you get home from the hospital unless otherwise instructed.  - Keep your incision out of water.  To keep the incision dry when showering, cover it with a plastic bag or plastic wrap.  - Pat incision dry if it gets wet.  Don’t rub.  - Do not submerge in a bath until staples are out and the incision is completely healed. (Approximately 6-8 weeks)  Dressing Change:  Procedure (if recommended by your physician)  - Wash hands.  - Open all new dressing change materials.  - Remove old dressing and discard.  - Inspect incision for redness, increase in clear drainage, yellow/green drainage, odor and surrounding skin hot to touch.  -  ABD (large gauze) pad or “island dressing” by one corner and lay over the  incision.  Be careful not to touch the inside of the dressing that will lay over the incision.  - Secure in place as instructed (Ace wrap or tape).    Swelling/Bruising    - Swelling can last from 3-6 months.  - Elevate your leg higher than your heart while reclining.   The first week you are home you should elevate your leg an equal amount of time, as you are active.    - Anti-inflammatory pills can be taken once you have stopped the blood thinners.  - The swelling is usually worse after you go home since you are upright for longer periods of time.  - Bruising is common and can involve the entire leg including the thigh, calf and even foot. Bruising often does not appear until after you arrive home and it can be quite dramatic- purple, black, and green.  The bruising you can see is not usually concerning and will subside without any treatment.      Blood Clot Prevention  Blood clots in the legs and the less common, but frightening, clots that travel to the lungs are a real focus of our preventative. Most patients are at standard risk for them, but those patients who are at higher risk include people who have had previous clots, a family history of clotting, smoking, diabetes, obesity, advanced age, use of estrogen and a sedentary lifestyle.    - Signs of blood clots in legs - Swelling in thigh, calf or ankle that does not go down with elevation.  Pain, heat and tenderness in calf, back of calf or groin area.  NOTE: blood clots can occur in either leg.  - You have been receiving anticoagulant therapy (blood thinners) in the hospital and you may be instructed to continue at home depending on your risk factors.  - Your risk for developing a clot continues for up to 2-3 months after surgery.  You should avoid prolonged sitting and dehydration during that time (long air trips and car trips).  If you do take a trip during this time, please get up and move around every 1- 1.5 hours.  - If you are prescribed blood-thinning  medication for home, follow instructions as directed. (Handouts provided if applicable).      Activity  Once home, you should continue to stay active. The key is to remember not to overdo it! While you can expect some good days and some bad days, you should notice a gradual improvement and a gradual increase in your endurance over the next 6 to 12 months. Exercise is a critical component of home care, particularly during the first few weeks after surgery.     - Normal activities of daily living You should be able to resume most within 3 to 6 weeks following surgery. Some pain with activity and at night is common for several weeks after surgery  -  Physical Therapy Exercises - Continue to do the exercises prescribed for at least two months after surgery. Riding a stationary bicycle can help maintain muscle tone and keep your knee flexible. Try to achieve the maximum degree of bending and extension possible. (handout provided by Therapist).  - Sexual Activity -. Your surgeon can tell you when it safe to resume sexual activity.    - Sleeping Positions - You can safely sleep on your back, on either side, or on your stomach.   - Other Activities - Walk as much as you like, but remember that walking is no substitute for the exercises your doctor and physical therapist will prescribe. Lower impact activities are preferred.  If you have specific questions, consult your Surgeon.    When to Call the Doctor   Call the physician if:   - Fever over 100.5? F  - Increased pain, drainage, redness, odor or heat around the incision area  - Shaking chills  - Increased knee pain with activity and rest  - Increased pain in calf, tenderness or redness above or below the knee  - Increased swelling of calf, ankle, foot  - Sudden increased shortness of breath, sudden onset of chest pain, localized chest pain with coughing  - Incision opening  Or, if there are any questions or concerns about medications or care.       -Is this patient being  discharged with medication to prevent blood clots?  No    · Is patient discharged on Warfarin / Coumadin?   No     Depression / Suicide Risk    As you are discharged from this [facility] facility, it is important to learn how to keep safe from harming yourself.    Recognize the warning signs:  · Abrupt changes in personality, positive or negative- including increase in energy   · Giving away possessions  · Change in eating patterns- significant weight changes-  positive or negative  · Change in sleeping patterns- unable to sleep or sleeping all the time   · Unwillingness or inability to communicate  · Depression  · Unusual sadness, discouragement and loneliness  · Talk of wanting to die  · Neglect of personal appearance   · Rebelliousness- reckless behavior  · Withdrawal from people/activities they love  · Confusion- inability to concentrate     If you or a loved one observes any of these behaviors or has concerns about self-harm, here's what you can do:  · Talk about it- your feelings and reasons for harming yourself  · Remove any means that you might use to hurt yourself (examples: pills, rope, extension cords, firearm)  · Get professional help from the community (Mental Health, Substance Abuse, psychological counseling)  · Do not be alone:Call your Safe Contact- someone whom you trust who will be there for you.  · Call your local CRISIS HOTLINE 038-5092 or 386-517-6890  · Call your local Children's Mobile Crisis Response Team [place] (140) 065-9098 or www.Jobzella  · Call the toll free National Suicide Prevention Hotlines   · National Suicide Prevention Lifeline 402-566-BCXZ (3982)  · National Hope Line Network 800-SUICIDE (150-9822)

## 2019-05-10 NOTE — DISCHARGE SUMMARY
Patient was admitted for a Total Knee Arthroplasty.  Had no complications during the surgery. Did well post-operatively.               There are no active hospital problems to display for this patient.      Uneventful hospital course.     Medication List      START taking these medications      Instructions   docusate sodium 100 MG Caps   Take 100 mg by mouth 2 Times a Day.  Dose:  100 mg     oxyCODONE immediate release 10 MG immediate release tablet  Commonly known as:  ROXICODONE   Take 1 Tab by mouth every four hours as needed for up to 14 days.  Dose:  10 mg        CONTINUE taking these medications      Instructions   ibuprofen 200 MG Tabs  Commonly known as:  MOTRIN   Take 600 mg by mouth every 6 hours as needed.  Dose:  600 mg              Patient will be discharged home and follow up with Dr. Red clinic in 2 weeks, for which the patient already has scheduled. Patient to begin Physical Therapy.  Encourage ROM of the knee.

## 2019-05-10 NOTE — THERAPY
"Occupational Therapy Evaluation completed.   Functional Status:  Pt s/p R TKA, WBAT. Pt was able to perform basic self care tasks, functional mobility and t/f's with supervision post education in ADL modifications for pain management, discussed shower s/u, AE and modification, reinforced graded oob mobility, use of ice and there-ex provided by PT while in acute; verbalized understanding. Pt and spouse had no further question's and concerns regarding basic self care tasks upon d/c home.   Plan of Care: Patient with no further skilled OT needs in the acute care setting at this time  Discharge Recommendations:  Equipment: Front-Wheel Walker. Post-acute therapy Anticipate that the patient will have no further occupational therapy needs after discharge from the hospital. Scheduled for OP PT      See \"Rehab Therapy-Acute\" Patient Summary Report for complete documentation.    "

## 2019-09-16 ENCOUNTER — HOSPITAL ENCOUNTER (OUTPATIENT)
Dept: LAB | Facility: MEDICAL CENTER | Age: 49
End: 2019-09-16
Attending: EMERGENCY MEDICINE
Payer: COMMERCIAL

## 2019-09-16 LAB
25(OH)D3 SERPL-MCNC: 26 NG/ML (ref 30–100)
ALBUMIN SERPL BCP-MCNC: 4.3 G/DL (ref 3.2–4.9)
ALBUMIN/GLOB SERPL: 1.6 G/DL
ALP SERPL-CCNC: 50 U/L (ref 30–99)
ALT SERPL-CCNC: 28 U/L (ref 2–50)
ANION GAP SERPL CALC-SCNC: 7 MMOL/L (ref 0–11.9)
APPEARANCE UR: CLEAR
AST SERPL-CCNC: 21 U/L (ref 12–45)
BASOPHILS # BLD AUTO: 0.5 % (ref 0–1.8)
BASOPHILS # BLD: 0.02 K/UL (ref 0–0.12)
BILIRUB SERPL-MCNC: 0.6 MG/DL (ref 0.1–1.5)
BILIRUB UR QL STRIP.AUTO: NEGATIVE
BUN SERPL-MCNC: 26 MG/DL (ref 8–22)
CALCIUM SERPL-MCNC: 9.4 MG/DL (ref 8.5–10.5)
CHLORIDE SERPL-SCNC: 106 MMOL/L (ref 96–112)
CHOLEST SERPL-MCNC: 234 MG/DL (ref 100–199)
CO2 SERPL-SCNC: 26 MMOL/L (ref 20–33)
COLOR UR: YELLOW
CORTIS SERPL-MCNC: 11.3 UG/DL (ref 0–23)
CREAT SERPL-MCNC: 1.25 MG/DL (ref 0.5–1.4)
CRP SERPL HS-MCNC: 0.9 MG/L (ref 0–7.5)
DHEA-S SERPL-MCNC: 36.8 UG/DL (ref 44.3–331)
EOSINOPHIL # BLD AUTO: 0.09 K/UL (ref 0–0.51)
EOSINOPHIL NFR BLD: 2.3 % (ref 0–6.9)
ERYTHROCYTE [DISTWIDTH] IN BLOOD BY AUTOMATED COUNT: 43.5 FL (ref 35.9–50)
EST. AVERAGE GLUCOSE BLD GHB EST-MCNC: 114 MG/DL
ESTRADIOL SERPL-MCNC: 27 PG/ML
FASTING STATUS PATIENT QL REPORTED: NORMAL
GGT SERPL-CCNC: 46 U/L (ref 7–51)
GLOBULIN SER CALC-MCNC: 2.7 G/DL (ref 1.9–3.5)
GLUCOSE SERPL-MCNC: 101 MG/DL (ref 65–99)
GLUCOSE UR STRIP.AUTO-MCNC: NEGATIVE MG/DL
HBA1C MFR BLD: 5.6 % (ref 0–5.6)
HCT VFR BLD AUTO: 47.1 % (ref 42–52)
HCYS SERPL-SCNC: 8.17 UMOL/L
HDLC SERPL-MCNC: 45 MG/DL
HGB BLD-MCNC: 15.8 G/DL (ref 14–18)
IMM GRANULOCYTES # BLD AUTO: 0.01 K/UL (ref 0–0.11)
IMM GRANULOCYTES NFR BLD AUTO: 0.3 % (ref 0–0.9)
KETONES UR STRIP.AUTO-MCNC: ABNORMAL MG/DL
LDH SERPL L TO P-CCNC: 83 U/L (ref 107–266)
LDLC SERPL CALC-MCNC: 156 MG/DL
LEUKOCYTE ESTERASE UR QL STRIP.AUTO: NEGATIVE
LYMPHOCYTES # BLD AUTO: 1.7 K/UL (ref 1–4.8)
LYMPHOCYTES NFR BLD: 42.6 % (ref 22–41)
MCH RBC QN AUTO: 30.4 PG (ref 27–33)
MCHC RBC AUTO-ENTMCNC: 33.5 G/DL (ref 33.7–35.3)
MCV RBC AUTO: 90.8 FL (ref 81.4–97.8)
MICRO URNS: ABNORMAL
MONOCYTES # BLD AUTO: 0.35 K/UL (ref 0–0.85)
MONOCYTES NFR BLD AUTO: 8.8 % (ref 0–13.4)
NEUTROPHILS # BLD AUTO: 1.82 K/UL (ref 1.82–7.42)
NEUTROPHILS NFR BLD: 45.5 % (ref 44–72)
NITRITE UR QL STRIP.AUTO: NEGATIVE
NRBC # BLD AUTO: 0 K/UL
NRBC BLD-RTO: 0 /100 WBC
PH UR STRIP.AUTO: 5.5 [PH] (ref 5–8)
PHOSPHATE SERPL-MCNC: 2.8 MG/DL (ref 2.5–4.5)
PLATELET # BLD AUTO: 233 K/UL (ref 164–446)
PMV BLD AUTO: 9.5 FL (ref 9–12.9)
POTASSIUM SERPL-SCNC: 4.4 MMOL/L (ref 3.6–5.5)
PROT SERPL-MCNC: 7 G/DL (ref 6–8.2)
PROT UR QL STRIP: NEGATIVE MG/DL
PSA SERPL-MCNC: 0.87 NG/ML (ref 0–4)
RBC # BLD AUTO: 5.19 M/UL (ref 4.7–6.1)
RBC UR QL AUTO: NEGATIVE
SODIUM SERPL-SCNC: 139 MMOL/L (ref 135–145)
SP GR UR STRIP.AUTO: 1.03
T3FREE SERPL-MCNC: 3.56 PG/ML (ref 2.4–4.2)
T4 FREE SERPL-MCNC: 0.8 NG/DL (ref 0.53–1.43)
TRIGL SERPL-MCNC: 167 MG/DL (ref 0–149)
TSH SERPL DL<=0.005 MIU/L-ACNC: 1.05 UIU/ML (ref 0.38–5.33)
URATE SERPL-MCNC: 5.7 MG/DL (ref 2.5–8.3)
UROBILINOGEN UR STRIP.AUTO-MCNC: 0.2 MG/DL
WBC # BLD AUTO: 4 K/UL (ref 4.8–10.8)

## 2019-09-16 PROCEDURE — 86141 C-REACTIVE PROTEIN HS: CPT

## 2019-09-16 PROCEDURE — 81003 URINALYSIS AUTO W/O SCOPE: CPT

## 2019-09-16 PROCEDURE — 84443 ASSAY THYROID STIM HORMONE: CPT

## 2019-09-16 PROCEDURE — 84481 FREE ASSAY (FT-3): CPT

## 2019-09-16 PROCEDURE — 83036 HEMOGLOBIN GLYCOSYLATED A1C: CPT

## 2019-09-16 PROCEDURE — 82977 ASSAY OF GGT: CPT

## 2019-09-16 PROCEDURE — 82670 ASSAY OF TOTAL ESTRADIOL: CPT

## 2019-09-16 PROCEDURE — 84153 ASSAY OF PSA TOTAL: CPT

## 2019-09-16 PROCEDURE — 83615 LACTATE (LD) (LDH) ENZYME: CPT

## 2019-09-16 PROCEDURE — 80053 COMPREHEN METABOLIC PANEL: CPT

## 2019-09-16 PROCEDURE — 80061 LIPID PANEL: CPT

## 2019-09-16 PROCEDURE — 84100 ASSAY OF PHOSPHORUS: CPT

## 2019-09-16 PROCEDURE — 84403 ASSAY OF TOTAL TESTOSTERONE: CPT

## 2019-09-16 PROCEDURE — 82306 VITAMIN D 25 HYDROXY: CPT

## 2019-09-16 PROCEDURE — 36415 COLL VENOUS BLD VENIPUNCTURE: CPT

## 2019-09-16 PROCEDURE — 82533 TOTAL CORTISOL: CPT

## 2019-09-16 PROCEDURE — 84270 ASSAY OF SEX HORMONE GLOBUL: CPT

## 2019-09-16 PROCEDURE — 83525 ASSAY OF INSULIN: CPT

## 2019-09-16 PROCEDURE — 82627 DEHYDROEPIANDROSTERONE: CPT

## 2019-09-16 PROCEDURE — 85025 COMPLETE CBC W/AUTO DIFF WBC: CPT

## 2019-09-16 PROCEDURE — 84439 ASSAY OF FREE THYROXINE: CPT

## 2019-09-16 PROCEDURE — 84550 ASSAY OF BLOOD/URIC ACID: CPT

## 2019-09-16 PROCEDURE — 83090 ASSAY OF HOMOCYSTEINE: CPT

## 2019-09-16 PROCEDURE — 84305 ASSAY OF SOMATOMEDIN: CPT

## 2019-09-17 LAB
FASTING STATUS PATIENT QL REPORTED: NORMAL
IGF-I SERPL-MCNC: 188 NG/ML (ref 68–225)
IGF-I Z-SCORE SERPL: 1.2
INSULIN P FAST SERPL-ACNC: 8 UIU/ML (ref 3–19)
SHBG SERPL-SCNC: 37 NMOL/L (ref 11–80)
TESTOST FREE MFR SERPL: 1.7 % (ref 1.6–2.9)
TESTOST FREE SERPL-MCNC: 68 PG/ML (ref 47–244)
TESTOST SERPL-MCNC: 395 NG/DL (ref 300–890)

## 2019-09-24 ENCOUNTER — IMMUNIZATION (OUTPATIENT)
Dept: OCCUPATIONAL MEDICINE | Facility: CLINIC | Age: 49
End: 2019-09-24

## 2019-09-24 DIAGNOSIS — Z23 NEED FOR VACCINATION: ICD-10-CM

## 2019-09-26 PROCEDURE — 90686 IIV4 VACC NO PRSV 0.5 ML IM: CPT | Performed by: PREVENTIVE MEDICINE

## 2019-12-12 ENCOUNTER — HOSPITAL ENCOUNTER (OUTPATIENT)
Dept: RADIOLOGY | Facility: MEDICAL CENTER | Age: 49
End: 2019-12-12
Attending: EMERGENCY MEDICINE
Payer: COMMERCIAL

## 2019-12-12 DIAGNOSIS — M25.551 RIGHT HIP PAIN: ICD-10-CM

## 2019-12-12 PROCEDURE — 73721 MRI JNT OF LWR EXTRE W/O DYE: CPT | Mod: RT

## 2019-12-19 ENCOUNTER — HOSPITAL ENCOUNTER (OUTPATIENT)
Dept: LAB | Facility: MEDICAL CENTER | Age: 49
End: 2019-12-19
Attending: EMERGENCY MEDICINE
Payer: COMMERCIAL

## 2019-12-19 LAB
BASOPHILS # BLD AUTO: 0.3 % (ref 0–1.8)
BASOPHILS # BLD: 0.02 K/UL (ref 0–0.12)
EOSINOPHIL # BLD AUTO: 0.05 K/UL (ref 0–0.51)
EOSINOPHIL NFR BLD: 0.8 % (ref 0–6.9)
ERYTHROCYTE [DISTWIDTH] IN BLOOD BY AUTOMATED COUNT: 42.5 FL (ref 35.9–50)
ESTRADIOL SERPL-MCNC: 36 PG/ML
HCT VFR BLD AUTO: 48 % (ref 42–52)
HGB BLD-MCNC: 16.7 G/DL (ref 14–18)
IMM GRANULOCYTES # BLD AUTO: 0.01 K/UL (ref 0–0.11)
IMM GRANULOCYTES NFR BLD AUTO: 0.2 % (ref 0–0.9)
LYMPHOCYTES # BLD AUTO: 1.82 K/UL (ref 1–4.8)
LYMPHOCYTES NFR BLD: 28.9 % (ref 22–41)
MCH RBC QN AUTO: 30.8 PG (ref 27–33)
MCHC RBC AUTO-ENTMCNC: 34.8 G/DL (ref 33.7–35.3)
MCV RBC AUTO: 88.6 FL (ref 81.4–97.8)
MONOCYTES # BLD AUTO: 0.46 K/UL (ref 0–0.85)
MONOCYTES NFR BLD AUTO: 7.3 % (ref 0–13.4)
NEUTROPHILS # BLD AUTO: 3.93 K/UL (ref 1.82–7.42)
NEUTROPHILS NFR BLD: 62.5 % (ref 44–72)
NRBC # BLD AUTO: 0 K/UL
NRBC BLD-RTO: 0 /100 WBC
PLATELET # BLD AUTO: 237 K/UL (ref 164–446)
PMV BLD AUTO: 10 FL (ref 9–12.9)
RBC # BLD AUTO: 5.42 M/UL (ref 4.7–6.1)
WBC # BLD AUTO: 6.3 K/UL (ref 4.8–10.8)

## 2019-12-19 PROCEDURE — 84270 ASSAY OF SEX HORMONE GLOBUL: CPT

## 2019-12-19 PROCEDURE — 85025 COMPLETE CBC W/AUTO DIFF WBC: CPT

## 2019-12-19 PROCEDURE — 84402 ASSAY OF FREE TESTOSTERONE: CPT

## 2019-12-19 PROCEDURE — 82670 ASSAY OF TOTAL ESTRADIOL: CPT

## 2019-12-19 PROCEDURE — 84403 ASSAY OF TOTAL TESTOSTERONE: CPT

## 2019-12-19 PROCEDURE — 36415 COLL VENOUS BLD VENIPUNCTURE: CPT

## 2019-12-21 LAB
SHBG SERPL-SCNC: 31 NMOL/L (ref 11–80)
TESTOST FREE MFR SERPL: 2.1 % (ref 1.6–2.9)
TESTOST FREE SERPL-MCNC: 189 PG/ML (ref 47–244)
TESTOST SERPL-MCNC: 883 NG/DL (ref 300–890)

## 2020-03-21 ENCOUNTER — APPOINTMENT (OUTPATIENT)
Dept: RADIOLOGY | Facility: MEDICAL CENTER | Age: 50
End: 2020-03-21
Attending: EMERGENCY MEDICINE
Payer: COMMERCIAL

## 2020-03-21 ENCOUNTER — HOSPITAL ENCOUNTER (EMERGENCY)
Facility: MEDICAL CENTER | Age: 50
End: 2020-03-21
Attending: EMERGENCY MEDICINE
Payer: COMMERCIAL

## 2020-03-21 VITALS
SYSTOLIC BLOOD PRESSURE: 200 MMHG | OXYGEN SATURATION: 97 % | BODY MASS INDEX: 32.44 KG/M2 | WEIGHT: 245.81 LBS | RESPIRATION RATE: 18 BRPM | HEART RATE: 78 BPM | TEMPERATURE: 97.8 F | DIASTOLIC BLOOD PRESSURE: 110 MMHG

## 2020-03-21 DIAGNOSIS — R07.9 CHEST PAIN, UNSPECIFIED TYPE: ICD-10-CM

## 2020-03-21 DIAGNOSIS — I21.3 ST ELEVATION MYOCARDIAL INFARCTION (STEMI), UNSPECIFIED ARTERY (HCC): ICD-10-CM

## 2020-03-21 LAB
ALBUMIN SERPL BCP-MCNC: 5 G/DL (ref 3.2–4.9)
ALBUMIN/GLOB SERPL: 1.5 G/DL
ALP SERPL-CCNC: 58 U/L (ref 30–99)
ALT SERPL-CCNC: 30 U/L (ref 2–50)
ANION GAP SERPL CALC-SCNC: 22 MMOL/L (ref 7–16)
AST SERPL-CCNC: 32 U/L (ref 12–45)
BASOPHILS # BLD AUTO: 0.3 % (ref 0–1.8)
BASOPHILS # BLD: 0.04 K/UL (ref 0–0.12)
BILIRUB SERPL-MCNC: 0.5 MG/DL (ref 0.1–1.5)
BUN SERPL-MCNC: 25 MG/DL (ref 8–22)
CALCIUM SERPL-MCNC: 10.2 MG/DL (ref 8.4–10.2)
CHLORIDE SERPL-SCNC: 102 MMOL/L (ref 96–112)
CO2 SERPL-SCNC: 18 MMOL/L (ref 20–33)
CREAT SERPL-MCNC: 1.49 MG/DL (ref 0.5–1.4)
EKG IMPRESSION: NORMAL
EOSINOPHIL # BLD AUTO: 0.03 K/UL (ref 0–0.51)
EOSINOPHIL NFR BLD: 0.2 % (ref 0–6.9)
ERYTHROCYTE [DISTWIDTH] IN BLOOD BY AUTOMATED COUNT: 43.2 FL (ref 35.9–50)
GLOBULIN SER CALC-MCNC: 3.3 G/DL (ref 1.9–3.5)
GLUCOSE SERPL-MCNC: 128 MG/DL (ref 65–99)
HCT VFR BLD AUTO: 55.6 % (ref 42–52)
HGB BLD-MCNC: 18.7 G/DL (ref 14–18)
IMM GRANULOCYTES # BLD AUTO: 0.07 K/UL (ref 0–0.11)
IMM GRANULOCYTES NFR BLD AUTO: 0.6 % (ref 0–0.9)
LYMPHOCYTES # BLD AUTO: 2.57 K/UL (ref 1–4.8)
LYMPHOCYTES NFR BLD: 20.5 % (ref 22–41)
MCH RBC QN AUTO: 29.8 PG (ref 27–33)
MCHC RBC AUTO-ENTMCNC: 33.6 G/DL (ref 33.7–35.3)
MCV RBC AUTO: 88.7 FL (ref 81.4–97.8)
MONOCYTES # BLD AUTO: 0.81 K/UL (ref 0–0.85)
MONOCYTES NFR BLD AUTO: 6.4 % (ref 0–13.4)
NEUTROPHILS # BLD AUTO: 9.04 K/UL (ref 1.82–7.42)
NEUTROPHILS NFR BLD: 72 % (ref 44–72)
NRBC # BLD AUTO: 0 K/UL
NRBC BLD-RTO: 0 /100 WBC
PLATELET # BLD AUTO: 256 K/UL (ref 164–446)
PMV BLD AUTO: 9.9 FL (ref 9–12.9)
POTASSIUM SERPL-SCNC: 3.8 MMOL/L (ref 3.6–5.5)
PROT SERPL-MCNC: 8.3 G/DL (ref 6–8.2)
RBC # BLD AUTO: 6.27 M/UL (ref 4.7–6.1)
SODIUM SERPL-SCNC: 142 MMOL/L (ref 135–145)
TROPONIN T SERPL-MCNC: 56 NG/L (ref 6–19)
WBC # BLD AUTO: 12.6 K/UL (ref 4.8–10.8)

## 2020-03-21 PROCEDURE — 84484 ASSAY OF TROPONIN QUANT: CPT

## 2020-03-21 PROCEDURE — 700111 HCHG RX REV CODE 636 W/ 250 OVERRIDE (IP)

## 2020-03-21 PROCEDURE — A9270 NON-COVERED ITEM OR SERVICE: HCPCS

## 2020-03-21 PROCEDURE — 96374 THER/PROPH/DIAG INJ IV PUSH: CPT

## 2020-03-21 PROCEDURE — 80053 COMPREHEN METABOLIC PANEL: CPT

## 2020-03-21 PROCEDURE — 85025 COMPLETE CBC W/AUTO DIFF WBC: CPT

## 2020-03-21 PROCEDURE — 99284 EMERGENCY DEPT VISIT MOD MDM: CPT

## 2020-03-21 PROCEDURE — 700102 HCHG RX REV CODE 250 W/ 637 OVERRIDE(OP)

## 2020-03-21 PROCEDURE — 71045 X-RAY EXAM CHEST 1 VIEW: CPT

## 2020-03-21 PROCEDURE — 93005 ELECTROCARDIOGRAM TRACING: CPT

## 2020-03-21 RX ORDER — MORPHINE SULFATE 4 MG/ML
INJECTION, SOLUTION INTRAMUSCULAR; INTRAVENOUS
Status: DISCONTINUED
Start: 2020-03-21 | End: 2020-03-21 | Stop reason: HOSPADM

## 2020-03-21 RX ORDER — NITROGLYCERIN 0.4 MG/1
0.4 TABLET SUBLINGUAL
Status: DISCONTINUED | OUTPATIENT
Start: 2020-03-21 | End: 2020-03-21 | Stop reason: HOSPADM

## 2020-03-21 RX ORDER — NITROGLYCERIN 0.4 MG/1
TABLET SUBLINGUAL
Status: DISCONTINUED
Start: 2020-03-21 | End: 2020-03-21 | Stop reason: HOSPADM

## 2020-03-21 RX ORDER — MORPHINE SULFATE 4 MG/ML
4 INJECTION, SOLUTION INTRAMUSCULAR; INTRAVENOUS ONCE
Status: DISCONTINUED | OUTPATIENT
Start: 2020-03-21 | End: 2020-03-21 | Stop reason: HOSPADM

## 2020-03-21 RX ORDER — MORPHINE SULFATE 4 MG/ML
INJECTION, SOLUTION INTRAMUSCULAR; INTRAVENOUS
Status: COMPLETED
Start: 2020-03-21 | End: 2020-03-21

## 2020-03-21 RX ORDER — NITROGLYCERIN 0.4 MG/1
TABLET SUBLINGUAL
Status: COMPLETED
Start: 2020-03-21 | End: 2020-03-21

## 2020-03-21 RX ORDER — ASPIRIN 81 MG/1
324 TABLET, CHEWABLE ORAL ONCE
Status: DISCONTINUED | OUTPATIENT
Start: 2020-03-21 | End: 2020-03-21 | Stop reason: HOSPADM

## 2020-03-21 RX ORDER — ASPIRIN 81 MG/1
TABLET, CHEWABLE ORAL
Status: COMPLETED
Start: 2020-03-21 | End: 2020-03-21

## 2020-03-21 RX ADMIN — NITROGLYCERIN 0.4 MG: 0.4 TABLET, ORALLY DISINTEGRATING SUBLINGUAL at 16:59

## 2020-03-21 RX ADMIN — MORPHINE SULFATE 4 MG: 4 INJECTION INTRAVENOUS at 17:00

## 2020-03-21 RX ADMIN — ASPIRIN 81 MG CHEWABLE TABLET 324 MG: 81 TABLET CHEWABLE at 17:00

## 2020-03-21 ASSESSMENT — FIBROSIS 4 INDEX: FIB4 SCORE: 0.84

## 2020-03-22 NOTE — ED TRIAGE NOTES
Pt walks into ED by self c/o substernal chest pain that started today while skiing. Pt c/o diaphoresis, nausea and weakness as well. Pt appears diaphoretic, pale, weak and under stress. ECG shows STEMI. Transferred STAT to cath lab by WILLIAM. Unable to complete triage assessment as pt was emergent, interventions were being formed and he was transferred STAT to another facility.

## 2020-03-22 NOTE — ED PROVIDER NOTES
ED Provider Note    Chief Complaint:   Chest pain    HPI:  Robby Nava is a 50 y.o. male who presents with chief complaint of chest pain.  He was backcountry skiing earlier in the day when he developed exertional chest pain.  Initially, he had resolution with rest, however with continued skiing his chest pain recurred.  He describes substernal severe chest pressure with associated diaphoresis, nausea, and lightheadedness.  He was able to ski out under his own power, however had to move very slowly to keep the pain under control.  He has no significant past medical history, no cardiac risk factors.  Currently takes no medications.  He is not on any anticoagulation or antiplatelet agents.  Pain did improve with decreased exertion, however did not resolve completely with rest during the drive to the emergency department.    Review of Systems:  See HPI for pertinent positives and negatives. All other systems negative.    Past Medical History:   has a past medical history of Arthritis.    Social History:  Social History     Tobacco Use   • Smoking status: Never Smoker   • Smokeless tobacco: Never Used   Substance and Sexual Activity   • Alcohol use: Yes     Comment: 2 drinks per week   • Drug use: No   • Sexual activity: Not on file       Surgical History:   has a past surgical history that includes carpal tunnel release (2011); acl repair (1997); knee arthroscopy (Right, 2012); carpal tunnel endoscopic (11/5/2013); ent surgery (2004); and total knee arthroplasty (Right, 5/9/2019).    Current Medications:  Home Medications    **Home medications have not yet been reviewed for this encounter**         Allergies:  No Known Allergies    Physical Exam:  Vital Signs: BP (!) 200/110   Pulse 78   Temp 36.6 °C (97.8 °F) (Temporal)   Resp 18   Wt 111.5 kg (245 lb 13 oz)   SpO2 97%   BMI 32.44 kg/m²   Constitutional: Alert, awake, moderate distress  HENT: Atraumatic, normocephalic  Eyes: Pupils equal and reactive, normal  conjunctiva  Neck: Supple, normal range of motion, no stridor  Cardiovascular: Extremities are warm and well perfused  Pulmonary: No respiratory distress, normal work of breathing, no accessory muscule usage  Abdomen: Soft, non-distended  Skin: Warm, dry, no rashes or lesions  Musculoskeletal: Normal range of motion in all extremities, no swelling or deformity noted  Neurologic: Alert, oriented, normal speech, normal motor function  Psychiatric: Normal and appropriate mood and affect    Medical records reviewed for continuity of care.  No pertinent medical records available for review.    EKG: Rate 80, normal sinus rhythm, ST elevation present in V2, V3, V4 with reciprocal depressions in leads III and aVF, STEMI criteria met    Labs:  Lab work pending at time of discharge    Radiology:  DX-CHEST-LIMITED (1 VIEW)   Final Result      No evidence of acute cardiopulmonary process.           ED Medications Administered:  Medications   morphine (pf) 4 mg/ml injection (has no administration in time range)   NITROGLYCERIN 0.4 MG SL SUBL (has no administration in time range)   morphine (pf) 4 MG/ML injection 4 mg (has no administration in time range)   nitroglycerin (NITROSTAT) tablet 0.4 mg (has no administration in time range)   aspirin (ASA) chewable tab 324 mg (has no administration in time range)   morphine (pf) 4 mg/ml injection (4 mg Intravenous Given 3/21/20 1700)   ASPIRIN 81 MG PO CHEW (324 mg Oral Given 3/21/20 1700)   NITROGLYCERIN 0.4 MG SL SUBL (0.4 mg Sublingual Given 3/21/20 1659)       Differential diagnosis:  Pulmonary embolism, ACS, aortic injury, pneumothorax    MDM:  Patient presents with exertional chest pain, diaphoresis, lightheadedness and nausea.  Immediate EKG was obtained on arrival that demonstrated ST elevation MI.  He was treated with 2 doses of nitroglycerin, 324 mg of aspirin, and morphine for pain.  There was slight improvement of pain after receiving morphine and nitroglycerin.  Second dose  of nitroglycerin was given.  Blood pressure immediately prior to discharge had improved to 174 systolic from systolic pressure of 200 on arrival.  Chest x-ray shows a normal mediastinum, patient has no risk factors for aortic dissection or aneurysm    STEMI activation was called and transferred to [facility] initiated immediately as we do not have Cath Lab capabilities at this facility.  Patient was initially excepted at [facility] for transfer by Dr. Orozco.  On further discussion with the interventional cardiology team, it was determined that [facility] did not currently have immediate cath lab availability, and that the patient could experience a delay in care.  [facility] was contacted and was able to accept the patient for immediate cardiac catheterization.  At this time, patient had just left our facility via ambulance.  Ambulance was diverted to [facility] due to immediate availability of Cath Lab. I discussed the case with Dr. Soto, emergency physician at [facility], who agreed to accept the patient.    Disposition:  Transfer to [facility]    Final Impression:  1. ST elevation myocardial infarction (STEMI), unspecified artery (HCC)    2. Chest pain, unspecified type        Electronically signed by: Alyx Matthews MD, 3/21/2020 6:33 PM

## 2020-05-19 ENCOUNTER — HOSPITAL ENCOUNTER (OUTPATIENT)
Dept: CARDIOLOGY | Facility: MEDICAL CENTER | Age: 50
End: 2020-05-19
Attending: INTERNAL MEDICINE
Payer: COMMERCIAL

## 2020-05-19 ENCOUNTER — HOSPITAL ENCOUNTER (OUTPATIENT)
Dept: LAB | Facility: MEDICAL CENTER | Age: 50
End: 2020-05-19
Attending: INTERNAL MEDICINE
Payer: COMMERCIAL

## 2020-05-19 DIAGNOSIS — I22.0: ICD-10-CM

## 2020-05-19 LAB
ALBUMIN SERPL BCP-MCNC: 4.2 G/DL (ref 3.2–4.9)
ALBUMIN/GLOB SERPL: 1.6 G/DL
ALP SERPL-CCNC: 78 U/L (ref 30–99)
ALT SERPL-CCNC: 48 U/L (ref 2–50)
ANION GAP SERPL CALC-SCNC: 11 MMOL/L (ref 7–16)
AST SERPL-CCNC: 29 U/L (ref 12–45)
BASOPHILS # BLD AUTO: 0.9 % (ref 0–1.8)
BASOPHILS # BLD: 0.03 K/UL (ref 0–0.12)
BILIRUB SERPL-MCNC: 0.7 MG/DL (ref 0.1–1.5)
BUN SERPL-MCNC: 19 MG/DL (ref 8–22)
CALCIUM SERPL-MCNC: 9.4 MG/DL (ref 8.5–10.5)
CHLORIDE SERPL-SCNC: 105 MMOL/L (ref 96–112)
CHOLEST SERPL-MCNC: 100 MG/DL (ref 100–199)
CO2 SERPL-SCNC: 22 MMOL/L (ref 20–33)
CREAT SERPL-MCNC: 1.02 MG/DL (ref 0.5–1.4)
EOSINOPHIL # BLD AUTO: 0.08 K/UL (ref 0–0.51)
EOSINOPHIL NFR BLD: 2.3 % (ref 0–6.9)
ERYTHROCYTE [DISTWIDTH] IN BLOOD BY AUTOMATED COUNT: 43.2 FL (ref 35.9–50)
EST. AVERAGE GLUCOSE BLD GHB EST-MCNC: 111 MG/DL
FASTING STATUS PATIENT QL REPORTED: NORMAL
GLOBULIN SER CALC-MCNC: 2.7 G/DL (ref 1.9–3.5)
GLUCOSE SERPL-MCNC: 105 MG/DL (ref 65–99)
HBA1C MFR BLD: 5.5 % (ref 0–5.6)
HCT VFR BLD AUTO: 44.8 % (ref 42–52)
HDLC SERPL-MCNC: 38 MG/DL
HGB BLD-MCNC: 15.2 G/DL (ref 14–18)
IMM GRANULOCYTES # BLD AUTO: 0.01 K/UL (ref 0–0.11)
IMM GRANULOCYTES NFR BLD AUTO: 0.3 % (ref 0–0.9)
LDLC SERPL CALC-MCNC: 43 MG/DL
LV EJECT FRACT MOD 2C 99903: 44.91
LV EJECT FRACT MOD 4C 99902: 47.41
LV EJECT FRACT MOD BP 99901: 47.09
LYMPHOCYTES # BLD AUTO: 1.48 K/UL (ref 1–4.8)
LYMPHOCYTES NFR BLD: 42.3 % (ref 22–41)
MCH RBC QN AUTO: 30.5 PG (ref 27–33)
MCHC RBC AUTO-ENTMCNC: 33.9 G/DL (ref 33.7–35.3)
MCV RBC AUTO: 89.8 FL (ref 81.4–97.8)
MONOCYTES # BLD AUTO: 0.34 K/UL (ref 0–0.85)
MONOCYTES NFR BLD AUTO: 9.7 % (ref 0–13.4)
NEUTROPHILS # BLD AUTO: 1.56 K/UL (ref 1.82–7.42)
NEUTROPHILS NFR BLD: 44.5 % (ref 44–72)
NRBC # BLD AUTO: 0 K/UL
NRBC BLD-RTO: 0 /100 WBC
PLATELET # BLD AUTO: 213 K/UL (ref 164–446)
PMV BLD AUTO: 9.8 FL (ref 9–12.9)
POTASSIUM SERPL-SCNC: 3.9 MMOL/L (ref 3.6–5.5)
PROT SERPL-MCNC: 6.9 G/DL (ref 6–8.2)
RBC # BLD AUTO: 4.99 M/UL (ref 4.7–6.1)
SODIUM SERPL-SCNC: 138 MMOL/L (ref 135–145)
TRIGL SERPL-MCNC: 97 MG/DL (ref 0–149)
WBC # BLD AUTO: 3.5 K/UL (ref 4.8–10.8)

## 2020-05-19 PROCEDURE — 85025 COMPLETE CBC W/AUTO DIFF WBC: CPT

## 2020-05-19 PROCEDURE — 80053 COMPREHEN METABOLIC PANEL: CPT

## 2020-05-19 PROCEDURE — 36415 COLL VENOUS BLD VENIPUNCTURE: CPT

## 2020-05-19 PROCEDURE — 84270 ASSAY OF SEX HORMONE GLOBUL: CPT

## 2020-05-19 PROCEDURE — 80061 LIPID PANEL: CPT

## 2020-05-19 PROCEDURE — 83695 ASSAY OF LIPOPROTEIN(A): CPT

## 2020-05-19 PROCEDURE — 93306 TTE W/DOPPLER COMPLETE: CPT

## 2020-05-19 PROCEDURE — 83704 LIPOPROTEIN BLD QUAN PART: CPT

## 2020-05-19 PROCEDURE — 84402 ASSAY OF FREE TESTOSTERONE: CPT

## 2020-05-19 PROCEDURE — 83036 HEMOGLOBIN GLYCOSYLATED A1C: CPT

## 2020-05-19 PROCEDURE — 84403 ASSAY OF TOTAL TESTOSTERONE: CPT

## 2020-05-20 LAB
LPA SERPL-MCNC: <6 MG/DL
SHBG SERPL-SCNC: 49 NMOL/L (ref 11–80)
TESTOST FREE MFR SERPL: 1.5 % (ref 1.6–2.9)
TESTOST FREE SERPL-MCNC: 68 PG/ML (ref 47–244)
TESTOST SERPL-MCNC: 458 NG/DL (ref 300–890)

## 2020-05-21 LAB
CHOLEST SERPL-MCNC: 106 MG/DL
HDL PARTICAL NO Q4363: 24.4 UMOL/L
HDL SIZE Q4361: 8.8 NM
HDLC SERPL-MCNC: 37 MG/DL (ref 40–59)
HLD.LARGE SERPL-SCNC: 3 UMOL/L
L VLDL PART NO Q4357: 1.5 NMOL/L
LDL SERPL QN: 21 NM
LDL SERPL-SCNC: 588 NMOL/L
LDL SMALL SERPL-SCNC: 322 NMOL/L
LDLC SERPL CALC-MCNC: 50 MG/DL
PATHOLOGY STUDY: ABNORMAL
TRIGL SERPL-MCNC: 95 MG/DL (ref 30–149)
VLDL SIZE Q4362: 47.1 NM

## 2020-05-29 ENCOUNTER — OFFICE VISIT (OUTPATIENT)
Dept: CARDIOLOGY | Facility: MEDICAL CENTER | Age: 50
End: 2020-05-29
Payer: COMMERCIAL

## 2020-05-29 VITALS
HEIGHT: 74 IN | HEART RATE: 68 BPM | SYSTOLIC BLOOD PRESSURE: 132 MMHG | WEIGHT: 223.8 LBS | BODY MASS INDEX: 28.72 KG/M2 | OXYGEN SATURATION: 96 % | DIASTOLIC BLOOD PRESSURE: 80 MMHG

## 2020-05-29 DIAGNOSIS — I21.02 STEMI INVOLVING LEFT ANTERIOR DESCENDING CORONARY ARTERY (HCC): ICD-10-CM

## 2020-05-29 DIAGNOSIS — I25.83 CORONARY ARTERY DISEASE DUE TO LIPID RICH PLAQUE: ICD-10-CM

## 2020-05-29 DIAGNOSIS — I25.10 CORONARY ARTERY DISEASE DUE TO LIPID RICH PLAQUE: ICD-10-CM

## 2020-05-29 DIAGNOSIS — Z95.5 S/P PRIMARY ANGIOPLASTY WITH CORONARY STENT: ICD-10-CM

## 2020-05-29 PROCEDURE — 99244 OFF/OP CNSLTJ NEW/EST MOD 40: CPT | Performed by: INTERNAL MEDICINE

## 2020-05-29 RX ORDER — PRASUGREL 10 MG/1
TABLET, FILM COATED ORAL
COMMUNITY
Start: 2020-05-13 | End: 2023-01-12

## 2020-05-29 RX ORDER — LISINOPRIL 5 MG/1
TABLET ORAL
COMMUNITY
Start: 2020-05-13

## 2020-05-29 RX ORDER — NITROGLYCERIN 400 UG/1
1 SPRAY ORAL PRN
Qty: 1 BOTTLE | Refills: 11 | Status: SHIPPED | OUTPATIENT
Start: 2020-05-29

## 2020-05-29 RX ORDER — CHLORAL HYDRATE 500 MG
1000 CAPSULE ORAL 2 TIMES DAILY
COMMUNITY

## 2020-05-29 RX ORDER — CARVEDILOL 6.25 MG/1
TABLET ORAL 2 TIMES DAILY
COMMUNITY
Start: 2020-05-19 | End: 2023-01-12

## 2020-05-29 RX ORDER — ATORVASTATIN CALCIUM 80 MG/1
80 TABLET, FILM COATED ORAL DAILY
COMMUNITY
Start: 2020-03-23

## 2020-05-29 RX ORDER — NITROGLYCERIN 0.4 MG/1
0.4 TABLET SUBLINGUAL
COMMUNITY
Start: 2020-03-23

## 2020-05-29 ASSESSMENT — ENCOUNTER SYMPTOMS
SHORTNESS OF BREATH: 0
BRUISES/BLEEDS EASILY: 0
FALLS: 0
PND: 0
CHILLS: 0
COUGH: 0
DIZZINESS: 0
CLAUDICATION: 0
ABDOMINAL PAIN: 0
NAUSEA: 0
BLURRED VISION: 0
FEVER: 0
PALPITATIONS: 0
WEAKNESS: 0
SORE THROAT: 0
FOCAL WEAKNESS: 0

## 2020-05-29 ASSESSMENT — FIBROSIS 4 INDEX: FIB4 SCORE: 0.98

## 2020-05-29 NOTE — PATIENT INSTRUCTIONS
Work on at least 1.5 hours a week of moderate exercise (typical brisk walking or similar activity)    Please look into the following diets and incorporate them into your diet    LOW SALT DIET   KEEP YOUR SODIUM EQUAL TO CALORIES AND NO MORE THAN DOUBLE THE CALORIES FOR A LOW SALT DIET    Cardiosmart.org - great resource for American College of Cardiology on heart disease prevention and treatment      FOR TREATMENT OR PREVENTION OF CORONARY ARTERY DISEASE  These three programs are approved by Medicare/Insurers for those with heart disease  [facility] - [facility]  Dr. Salinas's Program for Reversing Heart Disease - [place] Contreras's Cardiologist vegetarian-based  [facility] - [place]-based mind-body Program    This is a commonly referenced Program  Dr Posada - [place] [facility] [employer] (book and documentary) - vegetarian-based      FOR TREATMENT OF BLOOD PRESSURE  DASH DIET - American Heart Association for treatment of HYPERTENSION    FOR TREATMENT OF BAD CHOLESTEROL/FATS  REDUCE PROCESSED SUGAR AS MUCH AS POSSIBLE  INCREASE WHOLE GRAINS/VEGETABLES    Lowering total cholesterol and LDL (bad) cholesterol:  - Eat leaner cuts of meat, or eliminate altogether if possible red meat, and frequently substitute fish or chicken.  - Limit saturated fat to no more than 7-10% of total calories no more than 10 g per day is recommended. Some sources of saturated fat include butter, animal fats, hydrogenated vegetable fats and oils, many desserts, whole milk dairy products.  - Replaced saturated fats with polyunsaturated fats and monounsaturated fats. Foods high in monounsaturated fat include nuts, although well, canola oil, avocados, and olives.  - Limit trans fat (processed foods) and replaced with fresh fruits and vegetables  - Recommend nonfat dairy products  - Increase substantially the amount of soluble fiber intake (legumes such as beans, fruit, whole grains).  - Consider  nutritional supplements: plant sterile spreads such as Benecol, fish oil,  flaxseed oil, omega-3 acids capsules 1000 mg twice a day, or viscous fiber such as Metamucil  - Attain ideal weight and regular exercise (at least 30 minutes per day of walking)    Lowering triglycerides:  - Reduce intake of simple sugar: Desserts, candy, pastries, honey, sodas, sugared cereals, yogurt, Gatorade, sports bars, canned fruit, smoothies, fruit juice, coffee drinks  - Reduced intake of refined starches: Refined Pasta  - Reduce or abstain from alcohol  - Increase omega-3 fatty acids: [place], Trout, Mackerel, Herring, Albacore tuna and supplements  - Attain ideal weight and regular exercise (at least 30 minutes per day of walking)      Elevating HDL (good) cholesterol:  - Increase physical activity  - Seasoned foods with garlic and onions  - Increase omega-3 fatty acids and supplements as listed above  - Incorporating appropriate amounts of monounsaturated fats such as nuts, olive oil, canola oil, avocados, olives  - Stop smoking  - Attain ideal weight and regular exercise (at least 30 minutes per day of walking)

## 2020-05-29 NOTE — PROGRESS NOTES
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 Alyx Matthews for evaluation of his history of coronary disease status post anterior STEMI in March which was found in the setting of backcountry skiing he presented to [facility] our emergency room was on divert so he went to [place] where he was successfully stented to the LAD EF was mildly reduced he does have a follow-up echocardiogram which shows by reduced ejection fraction.    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 at SURGERY [facility]   • KNEE ARTHROSCOPY Right 2012   • CARPAL TUNNEL RELEASE  2011    right   • ENT SURGERY  2004    vocal cord lesion excision   • ACL REPAIR  1997    right     Family History   Problem Relation Age of Onset   • Hypertension Father      Social History     Socioeconomic History   • Marital status:      Spouse name: Not on file   • Number of children: Not on file   • Years of education: Not on file   • Highest education level: Not on file   Occupational History   • Not on file   Social Needs   •  Financial resource strain: Not on file   • Food insecurity     Worry: Not on file     Inability: Not on file   • Transportation needs     Medical: Not on file     Non-medical: Not on file   Tobacco Use   • Smoking status: Never Smoker   • Smokeless tobacco: Never Used   Substance and Sexual Activity   • Alcohol use: Yes     Comment: 2 drinks per week   • Drug use: No   • Sexual activity: Not on file   Lifestyle   • Physical activity     Days per week: Not on file     Minutes per session: Not on file   • Stress: Not on file   Relationships   • Social connections     Talks on phone: Not on file     Gets together: Not on file     Attends Yazidism service: Not on file     Active member of club or organization: Not on file     Attends meetings of clubs or organizations: Not on file     Relationship status: Not on file   • Intimate partner violence     Fear of current or ex partner: Not on file     Emotionally abused: Not on file     Physically abused: Not on file     Forced sexual activity: Not on file   Other Topics Concern   • Not on file   Social History Narrative   • Not on file     No Known Allergies  Outpatient Encounter Medications as of 5/29/2020   Medication Sig Dispense Refill   • atorvastatin (LIPITOR) 80 MG tablet Take 80 mg by mouth every day.     • carvedilol (COREG) 6.25 MG Tab 2 Times a Day.     • lisinopril (PRINIVIL) 5 MG Tab      • nitroglycerin (NITROSTAT) 0.4 MG SL Tab Place 0.4 mg under tongue.     • prasugrel (EFFIENT) 10 MG Tab      • aspirin EC (ECOTRIN) 81 MG Tablet Delayed Response Take 81 mg by mouth every day.     • Omega-3 Fatty Acids (FISH OIL) 1000 MG Cap capsule Take 1 Cap by mouth 2 Times a Day.     • nitroglycerin (NITROLINGUAL) 0.4 MG/SPRAY Solution Place 1 Spray under tongue as needed. 1 Bottle 11   • [DISCONTINUED] docusate sodium 100 MG Cap Take 100 mg by mouth 2 Times a Day. (Patient not taking: Reported on 5/29/2020) 60 Cap 0   • [DISCONTINUED] ibuprofen (MOTRIN) 200 MG Tab Take  "600 mg by mouth every 6 hours as needed.       No facility-administered encounter medications on file as of 5/29/2020.      Review of Systems   Constitutional: Negative for chills and fever.   HENT: Negative for sore throat.    Eyes: Negative for blurred vision.   Respiratory: Negative for cough and shortness of breath.    Cardiovascular: Negative for chest pain, palpitations, claudication, leg swelling and PND.   Gastrointestinal: Negative for abdominal pain and nausea.   Musculoskeletal: Negative for falls and joint pain.   Skin: Negative for rash.   Neurological: Negative for dizziness, focal weakness and weakness.   Endo/Heme/Allergies: Does not bruise/bleed easily.        Objective:   /80 (BP Location: Left arm, Patient Position: Sitting, BP Cuff Size: Adult)   Pulse 68   Ht 1.88 m (6' 2\")   Wt 101.5 kg (223 lb 12.8 oz)   SpO2 96%   BMI 28.73 kg/m²     Physical Exam   Constitutional: No distress.   HENT:   Mouth/Throat: Oropharynx is clear and moist. No oropharyngeal exudate.   Eyes: No scleral icterus.   Neck: No JVD present.   Cardiovascular: Normal rate and normal heart sounds. Exam reveals no gallop and no friction rub.   No murmur heard.  Pulmonary/Chest: No respiratory distress. He has no wheezes. He has no rales.   Abdominal: Soft. Bowel sounds are normal.   Musculoskeletal:         General: No edema.   Neurological: He is alert.   Skin: No rash noted. He is not diaphoretic.   Psychiatric: He has a normal mood and affect.     We reviewed in person the most recent labs  Recent Results (from the past 4032 hour(s))   CBC WITH DIFFERENTIAL    Collection Time: 12/19/19  3:25 PM   Result Value Ref Range    WBC 6.3 4.8 - 10.8 K/uL    RBC 5.42 4.70 - 6.10 M/uL    Hemoglobin 16.7 14.0 - 18.0 g/dL    Hematocrit 48.0 42.0 - 52.0 %    MCV 88.6 81.4 - 97.8 fL    MCH 30.8 27.0 - 33.0 pg    MCHC 34.8 33.7 - 35.3 g/dL    RDW 42.5 35.9 - 50.0 fL    Platelet Count 237 164 - 446 K/uL    MPV 10.0 9.0 - 12.9 fL    " Neutrophils-Polys 62.50 44.00 - 72.00 %    Lymphocytes 28.90 22.00 - 41.00 %    Monocytes 7.30 0.00 - 13.40 %    Eosinophils 0.80 0.00 - 6.90 %    Basophils 0.30 0.00 - 1.80 %    Immature Granulocytes 0.20 0.00 - 0.90 %    Nucleated RBC 0.00 /100 WBC    Neutrophils (Absolute) 3.93 1.82 - 7.42 K/uL    Lymphs (Absolute) 1.82 1.00 - 4.80 K/uL    Monos (Absolute) 0.46 0.00 - 0.85 K/uL    Eos (Absolute) 0.05 0.00 - 0.51 K/uL    Baso (Absolute) 0.02 0.00 - 0.12 K/uL    Immature Granulocytes (abs) 0.01 0.00 - 0.11 K/uL    NRBC (Absolute) 0.00 K/uL   ESTRADIOL    Collection Time: 19  3:25 PM   Result Value Ref Range    Estradiol-E2 36.0 pg/mL   TESTOSTERONE F&T MALE ADULT    Collection Time: 19  3:25 PM   Result Value Ref Range    Testosterone,Total 883 300 - 890 ng/dL    Sex Hormone Bind Globulin 31 11 - 80 nmol/L    Free Testosterone 189 47 - 244 pg/mL    Testosterone % Free 2.1 1.6 - 2.9 %   EKG    Collection Time: 20  4:53 PM   Result Value Ref Range    Report       [facility] Emergency Dept.    Test Date:  2020  Pt Name:    CHRISSY SANTOS                Department: [facility]  MRN:        [id number]                      [facility]  Gender:     M                            Technician: SYEDA  :        1970                   Requested By:ER TRIAGE PROTOCOL  Order #:    [id number]                    Reading MD: JOHNY JI MD    Measurements  Intervals                                Axis  Rate:       80                           P:          68  KY:         168                          QRS:        65  QRSD:       98                           T:          -13  QT:         387  QTc:        447    Interpretive Statements  Sinus rhythm  Anterolateral infarct, acute (LAD)  Compared to ECG 2019 12:49:09  Myocardial infarct finding now present  Sinus bradycardia no longer present  Electronically Signed On 3- 17:55:39 PDT by JOHNY JI MD     CBC WITH  DIFFERENTIAL    Collection Time: 03/21/20  4:58 PM   Result Value Ref Range    WBC 12.6 (H) 4.8 - 10.8 K/uL    RBC 6.27 (H) 4.70 - 6.10 M/uL    Hemoglobin 18.7 (H) 14.0 - 18.0 g/dL    Hematocrit 55.6 (H) 42.0 - 52.0 %    MCV 88.7 81.4 - 97.8 fL    MCH 29.8 27.0 - 33.0 pg    MCHC 33.6 (L) 33.7 - 35.3 g/dL    RDW 43.2 35.9 - 50.0 fL    Platelet Count 256 164 - 446 K/uL    MPV 9.9 9.0 - 12.9 fL    Neutrophils-Polys 72.00 44.00 - 72.00 %    Lymphocytes 20.50 (L) 22.00 - 41.00 %    Monocytes 6.40 0.00 - 13.40 %    Eosinophils 0.20 0.00 - 6.90 %    Basophils 0.30 0.00 - 1.80 %    Immature Granulocytes 0.60 0.00 - 0.90 %    Nucleated RBC 0.00 /100 WBC    Neutrophils (Absolute) 9.04 (H) 1.82 - 7.42 K/uL    Lymphs (Absolute) 2.57 1.00 - 4.80 K/uL    Monos (Absolute) 0.81 0.00 - 0.85 K/uL    Eos (Absolute) 0.03 0.00 - 0.51 K/uL    Baso (Absolute) 0.04 0.00 - 0.12 K/uL    Immature Granulocytes (abs) 0.07 0.00 - 0.11 K/uL    NRBC (Absolute) 0.00 K/uL   CMP    Collection Time: 03/21/20  4:58 PM   Result Value Ref Range    Sodium 142 135 - 145 mmol/L    Potassium 3.8 3.6 - 5.5 mmol/L    Chloride 102 96 - 112 mmol/L    Co2 18 (L) 20 - 33 mmol/L    Anion Gap 22.0 (H) 7.0 - 16.0    Glucose 128 (H) 65 - 99 mg/dL    Bun 25 (H) 8 - 22 mg/dL    Creatinine 1.49 (H) 0.50 - 1.40 mg/dL    Calcium 10.2 8.4 - 10.2 mg/dL    AST(SGOT) 32 12 - 45 U/L    ALT(SGPT) 30 2 - 50 U/L    Alkaline Phosphatase 58 30 - 99 U/L    Total Bilirubin 0.5 0.1 - 1.5 mg/dL    Albumin 5.0 (H) 3.2 - 4.9 g/dL    Total Protein 8.3 (H) 6.0 - 8.2 g/dL    Globulin 3.3 1.9 - 3.5 g/dL    A-G Ratio 1.5 g/dL   TROPONIN    Collection Time: 03/21/20  4:58 PM   Result Value Ref Range    Troponin T 56 (H) 6 - 19 ng/L   ESTIMATED GFR    Collection Time: 03/21/20  4:58 PM   Result Value Ref Range    GFR If African American >60 >60 mL/min/1.73 m 2    GFR If Non African American 50 (A) >60 mL/min/1.73 m 2   LIPOPROTEIN A    Collection Time: 05/19/20  8:13 AM   Result Value Ref Range     Lipoprotein A <6 <=29 mg/dL   ESTIMATED GFR    Collection Time: 05/19/20  8:13 AM   Result Value Ref Range    GFR If African American >60 >60 mL/min/1.73 m 2    GFR If Non African American >60 >60 mL/min/1.73 m 2   Comp Metabolic Panel    Collection Time: 05/19/20  8:13 AM   Result Value Ref Range    Sodium 138 135 - 145 mmol/L    Potassium 3.9 3.6 - 5.5 mmol/L    Chloride 105 96 - 112 mmol/L    Co2 22 20 - 33 mmol/L    Anion Gap 11.0 7.0 - 16.0    Glucose 105 (H) 65 - 99 mg/dL    Bun 19 8 - 22 mg/dL    Creatinine 1.02 0.50 - 1.40 mg/dL    Calcium 9.4 8.5 - 10.5 mg/dL    AST(SGOT) 29 12 - 45 U/L    ALT(SGPT) 48 2 - 50 U/L    Alkaline Phosphatase 78 30 - 99 U/L    Total Bilirubin 0.7 0.1 - 1.5 mg/dL    Albumin 4.2 3.2 - 4.9 g/dL    Total Protein 6.9 6.0 - 8.2 g/dL    Globulin 2.7 1.9 - 3.5 g/dL    A-G Ratio 1.6 g/dL   Lipid Profile    Collection Time: 05/19/20  8:13 AM   Result Value Ref Range    Cholesterol,Tot 100 100 - 199 mg/dL    Triglycerides 97 0 - 149 mg/dL    HDL 38 (A) >=40 mg/dL    LDL 43 <100 mg/dL   CBC WITH DIFFERENTIAL    Collection Time: 05/19/20  8:13 AM   Result Value Ref Range    WBC 3.5 (L) 4.8 - 10.8 K/uL    RBC 4.99 4.70 - 6.10 M/uL    Hemoglobin 15.2 14.0 - 18.0 g/dL    Hematocrit 44.8 42.0 - 52.0 %    MCV 89.8 81.4 - 97.8 fL    MCH 30.5 27.0 - 33.0 pg    MCHC 33.9 33.7 - 35.3 g/dL    RDW 43.2 35.9 - 50.0 fL    Platelet Count 213 164 - 446 K/uL    MPV 9.8 9.0 - 12.9 fL    Neutrophils-Polys 44.50 44.00 - 72.00 %    Lymphocytes 42.30 (H) 22.00 - 41.00 %    Monocytes 9.70 0.00 - 13.40 %    Eosinophils 2.30 0.00 - 6.90 %    Basophils 0.90 0.00 - 1.80 %    Immature Granulocytes 0.30 0.00 - 0.90 %    Nucleated RBC 0.00 /100 WBC    Neutrophils (Absolute) 1.56 (L) 1.82 - 7.42 K/uL    Lymphs (Absolute) 1.48 1.00 - 4.80 K/uL    Monos (Absolute) 0.34 0.00 - 0.85 K/uL    Eos (Absolute) 0.08 0.00 - 0.51 K/uL    Baso (Absolute) 0.03 0.00 - 0.12 K/uL    Immature Granulocytes (abs) 0.01 0.00 - 0.11 K/uL     NRBC (Absolute) 0.00 K/uL   HEMOGLOBIN A1C    Collection Time: 05/19/20  8:13 AM   Result Value Ref Range    Glycohemoglobin 5.5 0.0 - 5.6 %    Est Avg Glucose 111 mg/dL   TESTOSTERONE F&T MALE ADULT    Collection Time: 05/19/20  8:13 AM   Result Value Ref Range    Testosterone,Total 458 300 - 890 ng/dL    Sex Hormone Bind Globulin 49 11 - 80 nmol/L    Free Testosterone 68 47 - 244 pg/mL    Testosterone % Free 1.5 (L) 1.6 - 2.9 %   LipoFit by NMR    Collection Time: 05/19/20  8:13 AM   Result Value Ref Range    Cholesterol,Tot 106 <=199 mg/dL    Triglycerides 95 30 - 149 mg/dL    HDL 37 (L) 40 - 59 mg/dL    LDL Cholesterol 50 <=129 mg/dL    LDL Particle 588 <=1135 nmol/L    Small  <=634 nmol/L    L-VLDL Particle No. 1.5 <=2.7 nmol/L    HDL Particle No. 24.4 (L) >=33.0 umol/L    L-HDL Particle No. 3.0 (L) >=4.2 umol/L    LDL Particle Size 21.0 >=20.7 nm    VLDL Size 47.1 (H) <=46.7 nm    HDL Size 8.8 (L) >=8.9 nm    EER LipoFit by NMR See Note    FASTING STATUS    Collection Time: 05/19/20 10:30 AM   Result Value Ref Range    Fasting Status Fasting    EC-ECHOCARDIOGRAM COMPLETE W/O CONT    Collection Time: 05/19/20 12:54 PM   Result Value Ref Range    Eject.Frac. MOD BP 47.09     Eject.Frac. MOD 4C 47.41     Eject.Frac. MOD 2C 44.91      The images of his echocardiogram and reviewed the images of his EKG    Assessment:     1. Coronary artery disease due to lipid rich plaque  EC-ECHOCARDIOGRAM COMPLETE W/O CONT       Medical Decision Making:  Today's Assessment / Status / Plan:     It was my pleasure to meet with Mr. Nava.    Blood pressure is well controlled.  We specifically assessed the labs on hypertension treatment    He is on appropriate statin.    I do expect to have some subtle wall motion abnormality based on his event likely we see good wall motion in the apical portions of the LAD segment so perhaps the rest will improve with medications and time    We specifically discussed and he understands  the importance of dual antiplatelet therapy as well as the risk.    He will check into heart healthy program hopefully he is able to participate with his family heart healthy focused    I will see Mr. Nava back in 6 months time and encouraged him to follow up with us over the phone or electronically using my MyChart as issues arise.    It is my pleasure to participate in the care of Mr. Nava.  Please do not hesitate to contact me with questions or concerns.    Beau Salomon MD PhD [facility]  Cardiologist [facility]    Please note that this dictation was created using voice recognition software. There may be errors I did not discover before finalizing the note.

## 2020-05-29 NOTE — LETTER
[facility]   [address] [facility] [address]  [place] [address]  Phone: [phone]  Fax: [phone]              Robby Nava Jr., MD  1970    Encounter Date: 5/29/2020    Beau Salomon M.D.          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 Alyx Matthews for evaluation of his history of coronary disease status post anterior STEMI in March which was found in the setting of backcountry skiing he presented to [facility] our emergency room was on divert so he went to [facility] where he was successfully stented to the LAD EF was mildly reduced he does have a follow-up echocardiogram which shows by reduced ejection fraction.    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;  Service: Orthopedics   • CARPAL TUNNEL ENDOSCOPIC  11/5/2013    Performed by Toni Elizabeth M.D. at [facility]   • KNEE ARTHROSCOPY Right 2012   • CARPAL TUNNEL RELEASE  2011    right   • ENT SURGERY  2004    vocal cord lesion excision   • ACL REPAIR  1997    right     Family History   Problem Relation Age of Onset   • Hypertension Father       Social History     Socioeconomic History   • Marital status:      Spouse name: Not on file   • Number of children: Not on file   • Years of education: Not on file   • Highest education level: Not on file   Occupational History   • Not on file   Social Needs   • Financial resource strain: Not on file   • Food insecurity     Worry: Not on file     Inability: Not on file   • Transportation needs     Medical: Not on file     Non-medical: Not on file   Tobacco Use   • Smoking status: Never Smoker   • Smokeless tobacco: Never Used   Substance and Sexual Activity   • Alcohol use: Yes     Comment: 2 drinks per week   • Drug use: No   • Sexual activity: Not on file   Lifestyle   • Physical activity     Days per week: Not on file     Minutes per session: Not on file   • Stress: Not on file   Relationships   • Social connections     Talks on phone: Not on file     Gets together: Not on file     Attends Buddhism service: Not on file     Active member of club or organization: Not on file     Attends meetings of clubs or organizations: Not on file     Relationship status: Not on file   • Intimate partner violence     Fear of current or ex partner: Not on file     Emotionally abused: Not on file     Physically abused: Not on file     Forced sexual activity: Not on file   Other Topics Concern   • Not on file   Social History Narrative   • Not on file     No Known Allergies  Outpatient Encounter Medications as of 5/29/2020   Medication Sig Dispense Refill   • atorvastatin (LIPITOR) 80 MG tablet Take 80 mg by mouth every day.     • carvedilol (COREG) 6.25 MG Tab 2 Times a Day.     • lisinopril (PRINIVIL) 5 MG Tab      • nitroglycerin (NITROSTAT) 0.4 MG SL Tab Place 0.4 mg under tongue.     • prasugrel (EFFIENT) 10 MG Tab      • aspirin EC (ECOTRIN) 81 MG Tablet Delayed Response Take 81 mg by mouth every day.     • Omega-3 Fatty Acids (FISH OIL) 1000 MG Cap capsule Take 1 Cap by mouth 2 Times a Day.     • nitroglycerin  "(NITROLINGUAL) 0.4 MG/SPRAY Solution Place 1 Spray under tongue as needed. 1 Bottle 11   • [DISCONTINUED] docusate sodium 100 MG Cap Take 100 mg by mouth 2 Times a Day. (Patient not taking: Reported on 5/29/2020) 60 Cap 0   • [DISCONTINUED] ibuprofen (MOTRIN) 200 MG Tab Take 600 mg by mouth every 6 hours as needed.       No facility-administered encounter medications on file as of 5/29/2020.      Review of Systems   Constitutional: Negative for chills and fever.   HENT: Negative for sore throat.    Eyes: Negative for blurred vision.   Respiratory: Negative for cough and shortness of breath.    Cardiovascular: Negative for chest pain, palpitations, claudication, leg swelling and PND.   Gastrointestinal: Negative for abdominal pain and nausea.   Musculoskeletal: Negative for falls and joint pain.   Skin: Negative for rash.   Neurological: Negative for dizziness, focal weakness and weakness.   Endo/Heme/Allergies: Does not bruise/bleed easily.        Objective:   /80 (BP Location: Left arm, Patient Position: Sitting, BP Cuff Size: Adult)   Pulse 68   Ht 1.88 m (6' 2\")   Wt 101.5 kg (223 lb 12.8 oz)   SpO2 96%   BMI 28.73 kg/m²     Physical Exam   Constitutional: No distress.   HENT:   Mouth/Throat: Oropharynx is clear and moist. No oropharyngeal exudate.   Eyes: No scleral icterus.   Neck: No JVD present.   Cardiovascular: Normal rate and normal heart sounds. Exam reveals no gallop and no friction rub.   No murmur heard.  Pulmonary/Chest: No respiratory distress. He has no wheezes. He has no rales.   Abdominal: Soft. Bowel sounds are normal.   Musculoskeletal:         General: No edema.   Neurological: He is alert.   Skin: No rash noted. He is not diaphoretic.   Psychiatric: He has a normal mood and affect.     We reviewed in person the most recent labs  Recent Results (from the past 4032 hour(s))   CBC WITH DIFFERENTIAL    Collection Time: 12/19/19  3:25 PM   Result Value Ref Range    WBC 6.3 4.8 - 10.8 " K/uL    RBC 5.42 4.70 - 6.10 M/uL    Hemoglobin 16.7 14.0 - 18.0 g/dL    Hematocrit 48.0 42.0 - 52.0 %    MCV 88.6 81.4 - 97.8 fL    MCH 30.8 27.0 - 33.0 pg    MCHC 34.8 33.7 - 35.3 g/dL    RDW 42.5 35.9 - 50.0 fL    Platelet Count 237 164 - 446 K/uL    MPV 10.0 9.0 - 12.9 fL    Neutrophils-Polys 62.50 44.00 - 72.00 %    Lymphocytes 28.90 22.00 - 41.00 %    Monocytes 7.30 0.00 - 13.40 %    Eosinophils 0.80 0.00 - 6.90 %    Basophils 0.30 0.00 - 1.80 %    Immature Granulocytes 0.20 0.00 - 0.90 %    Nucleated RBC 0.00 /100 WBC    Neutrophils (Absolute) 3.93 1.82 - 7.42 K/uL    Lymphs (Absolute) 1.82 1.00 - 4.80 K/uL    Monos (Absolute) 0.46 0.00 - 0.85 K/uL    Eos (Absolute) 0.05 0.00 - 0.51 K/uL    Baso (Absolute) 0.02 0.00 - 0.12 K/uL    Immature Granulocytes (abs) 0.01 0.00 - 0.11 K/uL    NRBC (Absolute) 0.00 K/uL   ESTRADIOL    Collection Time: 19  3:25 PM   Result Value Ref Range    Estradiol-E2 36.0 pg/mL   TESTOSTERONE F&T MALE ADULT    Collection Time: 19  3:25 PM   Result Value Ref Range    Testosterone,Total 883 300 - 890 ng/dL    Sex Hormone Bind Globulin 31 11 - 80 nmol/L    Free Testosterone 189 47 - 244 pg/mL    Testosterone % Free 2.1 1.6 - 2.9 %   EKG    Collection Time: 20  4:53 PM   Result Value Ref Range    Report       [facility] Emergency Dept.    Test Date:  2020  Pt Name:    CHRISSY SANTOS                Department: [facility]  MRN:        [id number]                      [facility]  Gender:     M                            Technician: SYEDA  :        1970                   Requested By:ER TRIAGE PROTOCOL  Order #:    [id number]                    Reading MD: JOHNY JI MD    Measurements  Intervals                                Axis  Rate:       80                           P:          68  WY:         168                          QRS:        65  QRSD:       98                           T:          -13  QT:         387  QTc:         447    Interpretive Statements  Sinus rhythm  Anterolateral infarct, acute (LAD)  Compared to ECG 04/23/2019 12:49:09  Myocardial infarct finding now present  Sinus bradycardia no longer present  Electronically Signed On 3- 17:55:39 PDT by JOHNY JI MD     CBC WITH DIFFERENTIAL    Collection Time: 03/21/20  4:58 PM   Result Value Ref Range    WBC 12.6 (H) 4.8 - 10.8 K/uL    RBC 6.27 (H) 4.70 - 6.10 M/uL    Hemoglobin 18.7 (H) 14.0 - 18.0 g/dL    Hematocrit 55.6 (H) 42.0 - 52.0 %    MCV 88.7 81.4 - 97.8 fL    MCH 29.8 27.0 - 33.0 pg    MCHC 33.6 (L) 33.7 - 35.3 g/dL    RDW 43.2 35.9 - 50.0 fL    Platelet Count 256 164 - 446 K/uL    MPV 9.9 9.0 - 12.9 fL    Neutrophils-Polys 72.00 44.00 - 72.00 %    Lymphocytes 20.50 (L) 22.00 - 41.00 %    Monocytes 6.40 0.00 - 13.40 %    Eosinophils 0.20 0.00 - 6.90 %    Basophils 0.30 0.00 - 1.80 %    Immature Granulocytes 0.60 0.00 - 0.90 %    Nucleated RBC 0.00 /100 WBC    Neutrophils (Absolute) 9.04 (H) 1.82 - 7.42 K/uL    Lymphs (Absolute) 2.57 1.00 - 4.80 K/uL    Monos (Absolute) 0.81 0.00 - 0.85 K/uL    Eos (Absolute) 0.03 0.00 - 0.51 K/uL    Baso (Absolute) 0.04 0.00 - 0.12 K/uL    Immature Granulocytes (abs) 0.07 0.00 - 0.11 K/uL    NRBC (Absolute) 0.00 K/uL   CMP    Collection Time: 03/21/20  4:58 PM   Result Value Ref Range    Sodium 142 135 - 145 mmol/L    Potassium 3.8 3.6 - 5.5 mmol/L    Chloride 102 96 - 112 mmol/L    Co2 18 (L) 20 - 33 mmol/L    Anion Gap 22.0 (H) 7.0 - 16.0    Glucose 128 (H) 65 - 99 mg/dL    Bun 25 (H) 8 - 22 mg/dL    Creatinine 1.49 (H) 0.50 - 1.40 mg/dL    Calcium 10.2 8.4 - 10.2 mg/dL    AST(SGOT) 32 12 - 45 U/L    ALT(SGPT) 30 2 - 50 U/L    Alkaline Phosphatase 58 30 - 99 U/L    Total Bilirubin 0.5 0.1 - 1.5 mg/dL    Albumin 5.0 (H) 3.2 - 4.9 g/dL    Total Protein 8.3 (H) 6.0 - 8.2 g/dL    Globulin 3.3 1.9 - 3.5 g/dL    A-G Ratio 1.5 g/dL   TROPONIN    Collection Time: 03/21/20  4:58 PM   Result Value Ref Range    Troponin T 56 (H)  6 - 19 ng/L   ESTIMATED GFR    Collection Time: 03/21/20  4:58 PM   Result Value Ref Range    GFR If African American >60 >60 mL/min/1.73 m 2    GFR If Non African American 50 (A) >60 mL/min/1.73 m 2   LIPOPROTEIN A    Collection Time: 05/19/20  8:13 AM   Result Value Ref Range    Lipoprotein A <6 <=29 mg/dL   ESTIMATED GFR    Collection Time: 05/19/20  8:13 AM   Result Value Ref Range    GFR If African American >60 >60 mL/min/1.73 m 2    GFR If Non African American >60 >60 mL/min/1.73 m 2   Comp Metabolic Panel    Collection Time: 05/19/20  8:13 AM   Result Value Ref Range    Sodium 138 135 - 145 mmol/L    Potassium 3.9 3.6 - 5.5 mmol/L    Chloride 105 96 - 112 mmol/L    Co2 22 20 - 33 mmol/L    Anion Gap 11.0 7.0 - 16.0    Glucose 105 (H) 65 - 99 mg/dL    Bun 19 8 - 22 mg/dL    Creatinine 1.02 0.50 - 1.40 mg/dL    Calcium 9.4 8.5 - 10.5 mg/dL    AST(SGOT) 29 12 - 45 U/L    ALT(SGPT) 48 2 - 50 U/L    Alkaline Phosphatase 78 30 - 99 U/L    Total Bilirubin 0.7 0.1 - 1.5 mg/dL    Albumin 4.2 3.2 - 4.9 g/dL    Total Protein 6.9 6.0 - 8.2 g/dL    Globulin 2.7 1.9 - 3.5 g/dL    A-G Ratio 1.6 g/dL   Lipid Profile    Collection Time: 05/19/20  8:13 AM   Result Value Ref Range    Cholesterol,Tot 100 100 - 199 mg/dL    Triglycerides 97 0 - 149 mg/dL    HDL 38 (A) >=40 mg/dL    LDL 43 <100 mg/dL   CBC WITH DIFFERENTIAL    Collection Time: 05/19/20  8:13 AM   Result Value Ref Range    WBC 3.5 (L) 4.8 - 10.8 K/uL    RBC 4.99 4.70 - 6.10 M/uL    Hemoglobin 15.2 14.0 - 18.0 g/dL    Hematocrit 44.8 42.0 - 52.0 %    MCV 89.8 81.4 - 97.8 fL    MCH 30.5 27.0 - 33.0 pg    MCHC 33.9 33.7 - 35.3 g/dL    RDW 43.2 35.9 - 50.0 fL    Platelet Count 213 164 - 446 K/uL    MPV 9.8 9.0 - 12.9 fL    Neutrophils-Polys 44.50 44.00 - 72.00 %    Lymphocytes 42.30 (H) 22.00 - 41.00 %    Monocytes 9.70 0.00 - 13.40 %    Eosinophils 2.30 0.00 - 6.90 %    Basophils 0.90 0.00 - 1.80 %    Immature Granulocytes 0.30 0.00 - 0.90 %    Nucleated RBC 0.00 /100  WBC    Neutrophils (Absolute) 1.56 (L) 1.82 - 7.42 K/uL    Lymphs (Absolute) 1.48 1.00 - 4.80 K/uL    Monos (Absolute) 0.34 0.00 - 0.85 K/uL    Eos (Absolute) 0.08 0.00 - 0.51 K/uL    Baso (Absolute) 0.03 0.00 - 0.12 K/uL    Immature Granulocytes (abs) 0.01 0.00 - 0.11 K/uL    NRBC (Absolute) 0.00 K/uL   HEMOGLOBIN A1C    Collection Time: 05/19/20  8:13 AM   Result Value Ref Range    Glycohemoglobin 5.5 0.0 - 5.6 %    Est Avg Glucose 111 mg/dL   TESTOSTERONE F&T MALE ADULT    Collection Time: 05/19/20  8:13 AM   Result Value Ref Range    Testosterone,Total 458 300 - 890 ng/dL    Sex Hormone Bind Globulin 49 11 - 80 nmol/L    Free Testosterone 68 47 - 244 pg/mL    Testosterone % Free 1.5 (L) 1.6 - 2.9 %   LipoFit by NMR    Collection Time: 05/19/20  8:13 AM   Result Value Ref Range    Cholesterol,Tot 106 <=199 mg/dL    Triglycerides 95 30 - 149 mg/dL    HDL 37 (L) 40 - 59 mg/dL    LDL Cholesterol 50 <=129 mg/dL    LDL Particle 588 <=1135 nmol/L    Small  <=634 nmol/L    L-VLDL Particle No. 1.5 <=2.7 nmol/L    HDL Particle No. 24.4 (L) >=33.0 umol/L    L-HDL Particle No. 3.0 (L) >=4.2 umol/L    LDL Particle Size 21.0 >=20.7 nm    VLDL Size 47.1 (H) <=46.7 nm    HDL Size 8.8 (L) >=8.9 nm    EER LipoFit by NMR See Note    FASTING STATUS    Collection Time: 05/19/20 10:30 AM   Result Value Ref Range    Fasting Status Fasting    EC-ECHOCARDIOGRAM COMPLETE W/O CONT    Collection Time: 05/19/20 12:54 PM   Result Value Ref Range    Eject.Frac. MOD BP 47.09     Eject.Frac. MOD 4C 47.41     Eject.Frac. MOD 2C 44.91      The images of his echocardiogram and reviewed the images of his EKG    Assessment:     1. Coronary artery disease due to lipid rich plaque  EC-ECHOCARDIOGRAM COMPLETE W/O CONT       Medical Decision Making:  Today's Assessment / Status / Plan:     It was my pleasure to meet with Mr. Nava.    Blood pressure is well controlled.  We specifically assessed the labs on hypertension treatment    He is on  appropriate statin.    I do expect to have some subtle wall motion abnormality based on his event likely we see good wall motion in the apical portions of the LAD segment so perhaps the rest will improve with medications and time    We specifically discussed and he understands the importance of dual antiplatelet therapy as well as the risk.    He will check into heart healthy program hopefully he is able to participate with his family heart healthy focused    I will see Mr. Nava back in 6 months time and encouraged him to follow up with us over the phone or electronically using my MyChart as issues arise.    It is my pleasure to participate in the care of Mr. Nava.  Please do not hesitate to contact me with questions or concerns.    Beau Salomon MD PhD [facility]  Cardiologist [facility]    Please note that this dictation was created using voice recognition software. There may be errors I did not discover before finalizing the note.         Neto Buenrostro M.D.  [address]  [place] [address]  VIA Facsimile: [phone]     Alyx Matthews M.D.  [address]  [place] [address]  VIA Facsimile: [phone]

## 2020-06-05 ENCOUNTER — NON-PROVIDER VISIT (OUTPATIENT)
Dept: CARDIOLOGY | Facility: MEDICAL CENTER | Age: 50
End: 2020-06-05
Payer: COMMERCIAL

## 2020-06-05 DIAGNOSIS — Z95.5 STENTED CORONARY ARTERY: Primary | ICD-10-CM

## 2020-06-05 LAB — EKG IMPRESSION: NORMAL

## 2020-06-05 PROCEDURE — G0422 INTENS CARDIAC REHAB W/EXERC: HCPCS | Performed by: INTERNAL MEDICINE

## 2020-06-05 ASSESSMENT — PATIENT HEALTH QUESTIONNAIRE - PHQ9
1. LITTLE INTEREST OR PLEASURE IN DOING THINGS: 0
5. POOR APPETITE OR OVEREATING: 1
4. FEELING TIRED OR HAVING LITTLE ENERGY: 1
3. TROUBLE FALLING OR STAYING ASLEEP OR SLEEPING TOO MUCH: 0
6. FEELING BAD ABOUT YOURSELF - OR THAT YOU ARE A FAILURE OR HAVE LET YOURSELF OR YOUR FAMILY DOWN: 0
7. TROUBLE CONCENTRATING ON THINGS, SUCH AS READING THE NEWSPAPER OR WATCHING TELEVISION: 0
9. THOUGHTS THAT YOU WOULD BE BETTER OFF DEAD, OR OF HURTING YOURSELF: 0
SUM OF ALL RESPONSES TO PHQ QUESTIONS 1-9: 2
2. FEELING DOWN, DEPRESSED, IRRITABLE, OR HOPELESS: 0
SUM OF ALL RESPONSES TO PHQ9 QUESTIONS 1 AND 2: 0
SUM OF ALL RESPONSES TO PHQ QUESTIONS 1-9: 2
8. MOVING OR SPEAKING SO SLOWLY THAT OTHER PEOPLE COULD HAVE NOTICED. OR THE OPPOSITE, BEING SO FIGETY OR RESTLESS THAT YOU HAVE BEEN MOVING AROUND A LOT MORE THAN USUAL: 0

## 2020-06-05 NOTE — PROGRESS NOTES
Patient arrived for initial 1:1 Intensive Cardiac Rehabilitation Consultation and education with the RN.  A total of 40 minutes was spent with the patient during which time a partial cardiovascular assessment was completed, musculoskeletal limitations were addressed and reviewed in preparation to start the exercise portion of the program.  Education regarding the program was explained regarding exercise, nutrition and stress management, goals were discussed and implemented, and patient questions were answered at this time.  He was attending education from [id number] am    Corresponded with Ronaldo Salomon MD regarding limitations as Robby is an avid skier, biker, climber and exercises 5-7 days a week sometimes at high intensity.  His max heart rate was discussed and at this time we will gauge exercise limitations by hemodynamics, physical appearance, stated tolerance with activity and RPE.

## 2020-06-05 NOTE — PROGRESS NOTES
Individualized Treatment Plan   Cardiac Rehab Individual Treatment Plan  Initial/Reassessment/Discharge Assessment/ ReAssessment/ Discharge: Initial 20 Session #     1   MRN: [id number] Allergies: Patient has no known allergies.   Patient Name: Robby Terrell Nava Jr., MD : 1970 Risk Stratification: High    Diagnoses:   1. Stented coronary artery     Age: 50 y.o. Physician: Neto Buenrostro M.D.    Date of Event: 20 Specialist: Aime   Risk Factors:  Hypertension, Hyperlipidemia, Family History, Stress, Age, Male > 45   Exercise Nutrition Other Core Components/ Risk Factors Psychosocial   Stages of change Stages of change Stages of change Stages of change   Preparation Preparation Preparation Preparation   Fitness Test Lipids Learning Barriers Psychosocial Plan   DASI: (na) Available: Yes Learning Barriers: None Psychosocial Plan: Yes   DIST: 1222 Date: 20 Family Support Goals   Max HR: 106 Total: 106 mg/dL Yes Assess presence or absence of depression using a valid screening: Yes   Max BP: Peak Ex BP: 132/82 Tri mg/dL Lives: Spouse Use Stress Management: Yes   RPE: 7 HDL: 37 mg/dL Other Risk Factors Plan Adverse Events: Yes   SPO2: 98 %       MET: 2.74 LDL: 50 mg/dL Other Risk Factors/Plan: Yes Unexpected Events: Yes   EF= 50(20) Diabetes Tobacco Use Intervention   Ambulatory Status Diabetes: No Social History     Tobacco Use   Smoking Status Never Smoker   Smokeless Tobacco Never Used    Behavioral Health Consult: No   Fall Risk Assessed: Yes HbA1C: 5.5 % Date: 20 Goals Physician Referral: No   Exercise Ambulatory Status Assist Devices: None Monitors BS at Home: No Educate / Review and have understanding of cardiac disease prevention: Identifies Stressors: Yes   Exercise Plan Frequency: na Random BS: 105(20)  Drug Intervention: No     Weight Management  Outcome Survey Tools   Goals Weight: 100.7 kg (222 lb) Educate / Review and have understanding of cardiac  "disease prevention: Yes FP QOL Overall Score: 27.42   Home Exercise: Yes Height: 188 cm (6' 2.02\") Medication Compliance: Yes PHQ-9: 2   Mode: Walk, Bike, Swin, Gym, Hike BMI (Calculated): 28.49 Complete Tobacco Cessation: Education   Duration:  minutes  Goal weight: 200 Complete Tobacco Cessation: Yes MBSR Lectures/Videos: Yes   Frequency: 7 days active Waist: 38 inch Intervention Psychosocial Education: Coping Techniques, Positive Support System, S/S Depression, MBSR Lectures   Intervention Social History     Substance and Sexual Activity   Alcohol Use Yes    Comment: 2 drinks per week    Smoking Cessation Referral: N/A     Intervention: Yes Nutrition Plan Ind. Education / Counseling: N/A    Exercise Prescription Nutrition Plan: Yes Tobacco Adjunct: N/A    Mode: Treadmill, NuStep Nutrition Related Target Goals Healthy Heart Education: Medications Reviewed, Patient Education Binder Provided, Risk Factors Discussed    Frequency: 3 days/week LDL-C <100 if trig. > 200: N/A Weekly Lectures/ Videos/ Interactive Cooking School: No(CR only)    Duration: 45-60 min LDL-C < 70 for high risk patient:  LDL-C<70 for high risk patients: Yes Education    Intensity: 13-15 RPE  Healthy Heart Education: Medications Reviewed, Patient Education Binder Provided, Risk Factors Discussed    METS: 5.0-7.0 Non HDL-C Should be < 130:  Non HDL-C Should be < 130: Yes Hypertension Management   (Active Problem)    Progression: ^ increments of 1-5 to THR/RPE as tolerated      THR: THR: 20-30 BPM ^Resting HR(max ) HbA1C < 7%: Yes Resting BP: 122/84    Angina with Exercise?   Angina with Exercise: No   BMI < 25: Yes Hypertension Education      Dietary Goal: >=58 rate your plate, low sodium     Resistance Training? Resistance Training: Yes Rate your Plate: Pre(48) Lectures/ Videos/ Cooking School: No(CR only)    Education Intervention Hypertension Goals    Yes Dietician Consult/Class: Pending Medication Adherence : Yes    Equipment " "Orientation, Exercise Safety, S/S to Report, RPE Scale, Warm Up / Cool Down, Physically Active Nurse/Patient Discussion: Yes Home Self Monitoring : Yes    Exercise “Target Goals” Diabetes Ed Referral: No     Start Individual Exercise Rx Yes Discuss Maintenance /Wt Loss: Yes       Attend Cooking School: No(CR only)     BP < 140/90 or < 130/80 if DM or CKD Yes Education       Nutrition Education: Healthy Plant Based Eating, Sodium Reduction Cooking/ Lectures/ Cooking School/  RD 1:1     Aerobic activity 30 + min / day 5 days / wk Yes        Initial Assessment  Heart Sounds: Sinus Rhythm on monitor, will assess heart sounds at next visit.           Lung Sounds: Will assess lung sounds at next visit.          Edema: None present.      Right Peripheral Pulses:  Carotid: 2+  Radial: 2+  Dorsalis Pedis: (not assessed)  Posterior Tibialis: (not assessed)      Left Peripheral Pulses:  Carotid: 2+  Radial: 2+  Dorsalis Pedis: (not assessed)  Posterior Tibialis: (not assessed)       Incision: None present.       Color: WNL    Frame Size: Large       Cognitive Assessment: A&O X 4, no cognitive deficits reported.     Fall Assessment:    Gait: Steady  Balance: Steady  Upper Body: No pain or limitations with ROM  Lower Body: Right hip, right knee pain.    Recent Falls: None reported.      Current Medications and Vaccinations: Reviewed     Patient Stated Goals: \"I want to know what my exercise limitations are.\"             Exercise    Current : Hiking, biking, climbing 5-7 days a week,  minutes.   Goal: To feel confident with exercise and understand his limitations with heart rate and RPE.   Progress: Willing to attend a few sessions of CR.      Nutrition     Current: He says that he has started to eat a more plant based diet, has cut back on red meat, is not drinking alcohol.   Goal: To lose some weight, learn about heart healthy eating.  Healthier snacking.  Progress: Willing to make some dietary changes as recommended.    "   Hypertension     Current: Blood pressure WNL today.   Goal: Home monitoring, medication adherence, low sodium diet.   Progress: Willing to meet above goals for good blood pressure control.      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   Goal: To continue doing outdoor activities because that's what decreases his stress.   Progress: Willing to work on stress management.      Other     Notes: Monitor first 3 visits, He may exceed age predicted HR per cardiology if his RPE stay up to 15.

## 2020-06-05 NOTE — PROGRESS NOTES
Intensive Cardiac Rehabilitation (ICR) and Individual Treatment Plan (ITP) reviewed and signed.  Sharmin Holman MD

## 2020-06-16 ENCOUNTER — NON-PROVIDER VISIT (OUTPATIENT)
Dept: CARDIOLOGY | Facility: MEDICAL CENTER | Age: 50
End: 2020-06-16
Payer: COMMERCIAL

## 2020-06-16 DIAGNOSIS — Z95.5 STENTED CORONARY ARTERY: ICD-10-CM

## 2020-06-16 LAB — EKG IMPRESSION: NORMAL

## 2020-06-16 PROCEDURE — G0422 INTENS CARDIAC REHAB W/EXERC: HCPCS | Performed by: INTERNAL MEDICINE

## 2020-06-16 NOTE — PROGRESS NOTES
Heart sounds and lung sounds auscultated prior to exercise today.  Heart sounds: S1S2 WNL, lung sounds clear throughout bilaterally.

## 2020-06-18 ENCOUNTER — NON-PROVIDER VISIT (OUTPATIENT)
Dept: CARDIOLOGY | Facility: MEDICAL CENTER | Age: 50
End: 2020-06-18
Payer: COMMERCIAL

## 2020-06-18 DIAGNOSIS — Z95.5 STENTED CORONARY ARTERY: ICD-10-CM

## 2020-06-18 LAB — EKG IMPRESSION: NORMAL

## 2020-06-18 PROCEDURE — 93798 PHYS/QHP OP CAR RHAB W/ECG: CPT | Performed by: INTERNAL MEDICINE

## 2020-11-16 ENCOUNTER — APPOINTMENT (OUTPATIENT)
Dept: CARDIOLOGY | Facility: MEDICAL CENTER | Age: 50
End: 2020-11-16
Attending: INTERNAL MEDICINE
Payer: COMMERCIAL

## 2020-11-19 ENCOUNTER — TELEMEDICINE (OUTPATIENT)
Dept: CARDIOLOGY | Facility: MEDICAL CENTER | Age: 50
End: 2020-11-19
Payer: COMMERCIAL

## 2020-11-19 VITALS — BODY MASS INDEX: 26.05 KG/M2 | WEIGHT: 203 LBS | HEIGHT: 74 IN

## 2020-11-19 DIAGNOSIS — E78.5 DYSLIPIDEMIA: Chronic | ICD-10-CM

## 2020-11-19 DIAGNOSIS — I25.10 CORONARY ARTERY DISEASE DUE TO LIPID RICH PLAQUE: Chronic | ICD-10-CM

## 2020-11-19 DIAGNOSIS — Z95.5 S/P PRIMARY ANGIOPLASTY WITH CORONARY STENT: Chronic | ICD-10-CM

## 2020-11-19 DIAGNOSIS — I25.2 HISTORY OF ST ELEVATION MYOCARDIAL INFARCTION (STEMI): Chronic | ICD-10-CM

## 2020-11-19 DIAGNOSIS — I25.83 CORONARY ARTERY DISEASE DUE TO LIPID RICH PLAQUE: Chronic | ICD-10-CM

## 2020-11-19 PROCEDURE — 99214 OFFICE O/P EST MOD 30 MIN: CPT | Mod: 95,CR | Performed by: INTERNAL MEDICINE

## 2020-11-19 ASSESSMENT — ENCOUNTER SYMPTOMS
DIZZINESS: 0
FEVER: 0
CHILLS: 0
SORE THROAT: 0
BLURRED VISION: 0
CLAUDICATION: 0
NAUSEA: 0
PALPITATIONS: 0
FOCAL WEAKNESS: 0
COUGH: 0
PND: 0
ABDOMINAL PAIN: 0
WEAKNESS: 0
SHORTNESS OF BREATH: 0
FALLS: 0
BRUISES/BLEEDS EASILY: 0

## 2020-11-19 ASSESSMENT — FIBROSIS 4 INDEX: FIB4 SCORE: 0.98

## 2020-11-19 NOTE — LETTER
[facility]   [address]  [place] [address]  Phone: [phone]  Fax: [phone]              Robby Nava Jr., MD  1970    Encounter Date: 11/19/2020    Beau Salomon M.D.          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  He reports he is done some longer hikes and exercise does not seem to have the overall muscle strength that he had prior to the heart attack but has been working on this he did not particularly notice any cardiovascular restriction.    Past Medical History:   Diagnosis Date   • Arthritis     osteo, knees   • Coronary artery disease due to lipid rich plaque    • S/P primary angioplasty with coronary stent 4/8/2020   • STEMI involving left anterior descending coronary artery (HCC) 4/8/2020     Past Surgical History:   Procedure Laterality Date   • PB TOTAL KNEE ARTHROPLASTY Right 5/9/2019    Procedure: KNEE ARTHROPLASTY TOTAL;  Surgeon: Greg Red M.D.;  Location: SURGERY [facility];  Service: Orthopedics   • CARPAL TUNNEL ENDOSCOPIC  11/5/2013    Performed by Toni Elizabeth M.D. at SURGERY [facility]   • KNEE ARTHROSCOPY Right 2012   • CARPAL TUNNEL RELEASE  2011    right   • ENT SURGERY  2004    vocal cord lesion excision   • ACL REPAIR  1997    right     Family History   Problem Relation Age of Onset   • Hypertension Father      Social History     Socioeconomic History   • Marital status:      Spouse name: Not on file   • Number of children: Not on file   • Years of education: Not on file   •  Highest education level: Not on file   Occupational History   • Not on file   Social Needs   • Financial resource strain: Not on file   • Food insecurity     Worry: Not on file     Inability: Not on file   • Transportation needs     Medical: Not on file     Non-medical: Not on file   Tobacco Use   • Smoking status: Never Smoker   • Smokeless tobacco: Never Used   Substance and Sexual Activity   • Alcohol use: Yes     Comment: 2 drinks per week   • Drug use: No   • Sexual activity: Not on file   Lifestyle   • Physical activity     Days per week: Not on file     Minutes per session: Not on file   • Stress: Not on file   Relationships   • Social connections     Talks on phone: Not on file     Gets together: Not on file     Attends Sabianist service: Not on file     Active member of club or organization: Not on file     Attends meetings of clubs or organizations: Not on file     Relationship status: Not on file   • Intimate partner violence     Fear of current or ex partner: Not on file     Emotionally abused: Not on file     Physically abused: Not on file     Forced sexual activity: Not on file   Other Topics Concern   • Not on file   Social History Narrative   • Not on file     No Known Allergies  Outpatient Encounter Medications as of 11/19/2020   Medication Sig Dispense Refill   • atorvastatin (LIPITOR) 80 MG tablet Take 80 mg by mouth every day.     • carvedilol (COREG) 6.25 MG Tab 2 Times a Day.     • lisinopril (PRINIVIL) 5 MG Tab      • nitroglycerin (NITROSTAT) 0.4 MG SL Tab Place 0.4 mg under tongue.     • prasugrel (EFFIENT) 10 MG Tab      • aspirin EC (ECOTRIN) 81 MG Tablet Delayed Response Take 81 mg by mouth every day.     • Omega-3 Fatty Acids (FISH OIL) 1000 MG Cap capsule Take 1 Cap by mouth 2 Times a Day.     • nitroglycerin (NITROLINGUAL) 0.4 MG/SPRAY Solution Place 1 Spray under tongue as needed. 1 Bottle 11     No facility-administered encounter medications on file as of 11/19/2020.      Review of  "Systems   Constitutional: Negative for chills and fever.   HENT: Negative for sore throat.    Eyes: Negative for blurred vision.   Respiratory: Negative for cough and shortness of breath.    Cardiovascular: Negative for chest pain, palpitations, claudication, leg swelling and PND.   Gastrointestinal: Negative for abdominal pain and nausea.   Musculoskeletal: Negative for falls and joint pain.   Skin: Negative for rash.   Neurological: Negative for dizziness, focal weakness and weakness.   Endo/Heme/Allergies: Does not bruise/bleed easily.        Objective:   BP (!) 0/0 (BP Location: Left arm, Patient Position: Sitting, BP Cuff Size: Adult)   Ht 1.88 m (6' 2\")   Wt 92.1 kg (203 lb)   BMI 26.06 kg/m²     Physical Exam   Constitutional: No distress.   HENT:   Patient wearing a mask due to COVID precautions   Eyes: No scleral icterus.   Neck: No JVD present.   Cardiovascular: Normal rate and normal heart sounds. Exam reveals no gallop and no friction rub.   No murmur heard.  Pulmonary/Chest: No respiratory distress. He has no wheezes. He has no rales.   Abdominal: Soft. Bowel sounds are normal.   Musculoskeletal:         General: No edema.   Neurological: He is alert.   Skin: No rash noted. He is not diaphoretic.   Psychiatric: He has a normal mood and affect.     We reviewed in person the most recent labs  Recent Results (from the past 4032 hour(s))   Exercise With/Without ECG    Collection Time: 20 11:06 AM   Result Value Ref Range    Report       [facility]    Test Date:  2020  Pt Name:    CHRISSY SANTOS                Department: [facility]  MRN:        [id number]                      Room:  Gender:     Male                         Technician:  :        1970                   Requested By:BERTA KAYE  Order #:    [id number]                    Devan MD: Penelope Scott      Interpretive Statements      Electronically Reviewed On 2020 13:06:01 PDT by Staff Member " Penelope Scott     Exercise With/Without ECG    Collection Time: 20  8:31 AM   Result Value Ref Range    Report       [facility] Intensive Cardiac Rehab    Test Date:  2020  Pt Name:    CHRISSY SANTOS                Department: [facility]  MRN:        [id number]                      Room:  Gender:     Male                         Technician:  :        1970                   Requested By:BERTA KAYE  Order #:    [id number]                    Reading MD: Penelope Tyler      Interpretive Statements      Electronically Reviewed On 2020 10:21:18 PDT by Staff Member Penelope Scott     Exercise With/Without ECG    Collection Time: 20  9:00 AM   Result Value Ref Range    Report       [facility] Intensive [facility]    Test Date:  2020  Pt Name:    CHRISSY SANTOS                Department: [facility]  MRN:        [id number]                      Room:  Gender:     Male                         Technician:  :        1970                   Requested By:BERTA KAYE  Order #:    [id number]                    Reading MD: Stephanie Keller      Interpretive Statements      Electronically Reviewed On 2020 10:54:45 PDT by Staff Member Stephanie Keller           Assessment:     1. Coronary artery disease due to lipid rich plaque     2. S/P primary angioplasty with coronary stent     3. History of ST elevation myocardial infarction (STEMI) - 3/2020 LAD     4. Dyslipidemia         Medical Decision Making:  Today's Assessment / Status / Plan:     It was my pleasure to meet with Mr. Santos.    Blood pressure is well controlled.  We specifically assessed the labs on hypertension treatment    He is on appropriate statin.    He will have an echocardiogram in December    He is updated his labs well controlled dyslipidemia on high-dose statin    We specifically discussed and he understands the importance of dual antiplatelet therapy as well as the risk.  Likely defer colonoscopy and other  elective things till a year after the stenting although he could stop antiplatelet short-term now if ever necessary    I will see Mr. Nava back in 1 year time and encouraged him to follow up with us over the phone or electronically using my MyChart as issues arise.    It is my pleasure to participate in the care of Mr. Nava.  Please do not hesitate to contact me with questions or concerns.    Beau Salomon MD PhD [facility]  Cardiologist [facility] Heart and Vascular Health    Please note that this dictation was created using voice recognition software. There may be errors I did not discover before finalizing the note.           Neto Buenrostro M.D.  [id number]  [place] [address]  Via Fax: [phone]

## 2020-11-19 NOTE — PATIENT INSTRUCTIONS
Work on at least 1.5 - 5 hours a week of moderate exercise (typical brisk walking or similar activity)    If you have had a heart attack, stent, bypass or reduced heart strength (EF <35%): cardiac rehab may reduce your risk of dying by 13-24% and need to go to the hospital by 30% within the first year (1)    Please look into the following diets and incorporate them into your diet    LOW SALT DIET   KEEP YOUR SODIUM EQUAL TO CALORIES AND NO MORE THAN DOUBLE THE CALORIES FOR A LOW SALT DIET    Cardiosmart.org - great resource for American College of Cardiology on heart disease prevention and treatment    FOR TREATMENT OR PREVENTION OF CORONARY ARTERY DISEASE  These three programs are approved by Medicare/Insurers for those with heart disease  [facility] - [facility] Intensive [facility]iac [facility]  Dr. Salinas's Program for Reversing Heart Disease - Galo Contreras's Cardiologist vegetarian-based  [facility] Cardiac Wellness Program - [place]-based mind-body Program    This is a commonly referenced Program  Dr Posada - Yrn over [employer] (book and documentary) - vegetarian-based    FOR TREATMENT OF BLOOD PRESSURE  DASH DIET - American Heart Association for treatment of HYPERTENSION    FOR TREATMENT OF BAD CHOLESTEROL/FATS  REDUCE PROCESSED SUGAR AS MUCH AS POSSIBLE  INCREASE WHOLE GRAINS/VEGETABLES  INCREASE FIBER    Lowering total cholesterol and LDL (bad) cholesterol:  - Eat leaner cuts of meat, or eliminate altogether if possible red meat, and frequently substitute fish or chicken.  - Limit saturated fat to no more than 7-10% of total calories no more than 10 g per day is recommended. Some sources of saturated fat include butter, animal fats, hydrogenated vegetable fats and oils, many desserts, whole milk dairy products.  - Replaced saturated fats with polyunsaturated fats and monounsaturated fats. Foods high in monounsaturated fat include nuts, canola oil, avocados, and olives.  - Limit trans fat (processed foods)  and replaced with fresh fruits and vegetables  - Recommend nonfat dairy products  - Increase substantially the amount of soluble fiber intake (legumes such as beans, fruit, whole grains).  - Consider nutritional supplements: plant sterile spreads such as Benecol, fish oil,  flaxseed oil, omega-3 acids capsules 1000 mg twice a day, or viscous fiber such as Metamucil  - Attain ideal weight and regular exercise (at least 30 minutes per day of moderate exercise)  ASK ABOUT STATIN OR NON STATIN MEDICATION TO REDUCE YOUR LDL AND HEART RISK    Lowering triglycerides:  - Reduce intake of simple sugar: Desserts, candy, pastries, honey, sodas, sugared cereals, yogurt, Gatorade, sports bars, canned fruit, smoothies, fruit juice, coffee drinks  - Reduced intake of refined starches: Refined Pasta, most bread  - Reduce or abstain from alcohol  - Increase omega-3 fatty acids: [place], Trout, Mackerel, Herring, Albacore tuna and supplements  - Attain ideal weight and regular exercise (at least 30 minutes per day of moderate exercise)  ASK ABOUT PURIFIED OMEGA 3 EPA or FISH OIL TO REDUCE YOUR TG AND HEART RISK    Elevating HDL (good) cholesterol:  - Increase physical activity  - Increase omega-3 fatty acids and supplements as listed above  - Incorporating appropriate amounts of monounsaturated fats such as nuts, olive oil, canola oil, avocados, olives  - Stop smoking  - Attain ideal weight and regular exercise (at least 30 minutes per day of moderate exercise)

## 2020-11-19 NOTE — LETTER
[facility] and Vascular [facility]   [address]  [place] [address]  Phone: [phone]  Fax: [phone]              Robby Nava Jr., MD  1970    Encounter Date: 11/19/2020    Beau Salomon M.D.          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  He reports he is done some longer hikes and exercise does not seem to have the overall muscle strength that he had prior to the heart attack but has been working on this he did not particularly notice any cardiovascular restriction.    Past Medical History:   Diagnosis Date   • Arthritis     osteo, knees   • Coronary artery disease due to lipid rich plaque    • S/P primary angioplasty with coronary stent 4/8/2020   • STEMI involving left anterior descending coronary artery (HCC) 4/8/2020     Past Surgical History:   Procedure Laterality Date   • PB TOTAL KNEE ARTHROPLASTY Right 5/9/2019    Procedure: KNEE ARTHROPLASTY TOTAL;  Surgeon: Greg Red M.D.;  Location: SURGERY [facility];  Service: Orthopedics   • CARPAL TUNNEL ENDOSCOPIC  11/5/2013    Performed by Toni Elizabeth M.D. at SURGERY [facility]   • KNEE ARTHROSCOPY Right 2012   • CARPAL TUNNEL RELEASE  2011    right   • ENT SURGERY  2004    vocal cord lesion excision   • ACL REPAIR  1997    right     Family History   Problem Relation Age of Onset   • Hypertension Father      Social History     Socioeconomic History   • Marital status:      Spouse name: Not on file   • Number of children: Not on file   • Years of education: Not on file   •  Highest education level: Not on file   Occupational History   • Not on file   Social Needs   • Financial resource strain: Not on file   • Food insecurity     Worry: Not on file     Inability: Not on file   • Transportation needs     Medical: Not on file     Non-medical: Not on file   Tobacco Use   • Smoking status: Never Smoker   • Smokeless tobacco: Never Used   Substance and Sexual Activity   • Alcohol use: Yes     Comment: 2 drinks per week   • Drug use: No   • Sexual activity: Not on file   Lifestyle   • Physical activity     Days per week: Not on file     Minutes per session: Not on file   • Stress: Not on file   Relationships   • Social connections     Talks on phone: Not on file     Gets together: Not on file     Attends Rastafari service: Not on file     Active member of club or organization: Not on file     Attends meetings of clubs or organizations: Not on file     Relationship status: Not on file   • Intimate partner violence     Fear of current or ex partner: Not on file     Emotionally abused: Not on file     Physically abused: Not on file     Forced sexual activity: Not on file   Other Topics Concern   • Not on file   Social History Narrative   • Not on file     No Known Allergies  Outpatient Encounter Medications as of 11/19/2020   Medication Sig Dispense Refill   • atorvastatin (LIPITOR) 80 MG tablet Take 80 mg by mouth every day.     • carvedilol (COREG) 6.25 MG Tab 2 Times a Day.     • lisinopril (PRINIVIL) 5 MG Tab      • nitroglycerin (NITROSTAT) 0.4 MG SL Tab Place 0.4 mg under tongue.     • prasugrel (EFFIENT) 10 MG Tab      • aspirin EC (ECOTRIN) 81 MG Tablet Delayed Response Take 81 mg by mouth every day.     • Omega-3 Fatty Acids (FISH OIL) 1000 MG Cap capsule Take 1 Cap by mouth 2 Times a Day.     • nitroglycerin (NITROLINGUAL) 0.4 MG/SPRAY Solution Place 1 Spray under tongue as needed. 1 Bottle 11     No facility-administered encounter medications on file as of 11/19/2020.      Review of  "Systems   Constitutional: Negative for chills and fever.   HENT: Negative for sore throat.    Eyes: Negative for blurred vision.   Respiratory: Negative for cough and shortness of breath.    Cardiovascular: Negative for chest pain, palpitations, claudication, leg swelling and PND.   Gastrointestinal: Negative for abdominal pain and nausea.   Musculoskeletal: Negative for falls and joint pain.   Skin: Negative for rash.   Neurological: Negative for dizziness, focal weakness and weakness.   Endo/Heme/Allergies: Does not bruise/bleed easily.        Objective:   BP (!) 0/0 (BP Location: Left arm, Patient Position: Sitting, BP Cuff Size: Adult)   Ht 1.88 m (6' 2\")   Wt 92.1 kg (203 lb)   BMI 26.06 kg/m²     Physical Exam   Vital signs are reported by the patient    General appears well  Eyes no icterus  Extremities no edema  Skin no apparent rashes    This encounter was conducted via [employer]  Video Virtual Visit.   Verbal consent was obtained. Patient's identity was verified.    We reviewed in person the most recent labs  Recent Results (from the past 4032 hour(s))   Exercise With/Without ECG    Collection Time: 20 11:06 AM   Result Value Ref Range    Report       [facility] Cardiology Intensive Cardiac Rehab    Test Date:  2020  Pt Name:    CHRISSY CEBALLOS                Department: [facility]  MRN:        [id number]                      Room:  Gender:     Male                         Technician:  :        1970                   Requested By:BERTA KAYE  Order #:    [id number]                    Devan MD: Penelope Scott      Interpretive Statements      Electronically Reviewed On 2020 13:06:01 PDT by Staff Member Penelope Scott     Exercise With/Without ECG    Collection Time: 20  8:31 AM   Result Value Ref Range    Report       [facility] Cardiology Intensive Cardiac Rehab    Test Date:  2020  Pt Name:    CHRISSY SANTOS                Department: [facility]  MRN:        [id number]                      " Room:  Gender:     Male                         Technician:  :        1970                   Requested By:BERTA KAYE  Order #:    [id number]                    Reading MD: Penelope Scott      Interpretive Statements      Electronically Reviewed On 2020 10:21:18 PDT by Staff Member Penelope Scott     Exercise With/Without ECG    Collection Time: 20  9:00 AM   Result Value Ref Range    Report       [facility]    Test Date:  2020  Pt Name:    CHRISSY SANTOS                Department: [facility]  MRN:        [id number]                      Room:  Gender:     Male                         Technician:  :        1970                   Requested By:BERTA KAYE  Order #:    [id number]                    Reading MD: Stephanie Keller      Interpretive Statements      Electronically Reviewed On 2020 10:54:45 PDT by Staff Member Stephanie Keller           Assessment:     1. Coronary artery disease due to lipid rich plaque     2. S/P primary angioplasty with coronary stent     3. History of ST elevation myocardial infarction (STEMI) - 3/2020 LAD     4. Dyslipidemia         Medical Decision Making:  Today's Assessment / Status / Plan:     It was my pleasure to meet with Mr. Santos.    Blood pressure is well controlled.  We specifically assessed the labs on hypertension treatment    He is on appropriate statin.    He will have an echocardiogram in December    He is updated his labs well controlled dyslipidemia on high-dose statin    We specifically discussed and he understands the importance of dual antiplatelet therapy as well as the risk.  Likely defer colonoscopy and other elective things till a year after the stenting although he could stop antiplatelet short-term now if ever necessary    I will see Mr. Santos back in 1 year time and encouraged him to follow up with us over the phone or electronically using my [employer] as issues arise.    It is my pleasure to  participate in the care of Mr. Nava.  Please do not hesitate to contact me with questions or concerns.    Beau Salomon MD PhD [facility]  Cardiologist [facility] Heart and Vascular Health    Please note that this dictation was created using voice recognition software. There may be errors I did not discover before finalizing the note.           Neto Buenrostro M.D.  [address] [place] [address]  Via Fax: [phone]

## 2020-11-19 NOTE — PROGRESS NOTES
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  He reports he is done some longer hikes and exercise does not seem to have the overall muscle strength that he had prior to the heart attack but has been working on this he did not particularly notice any cardiovascular restriction.    Past Medical History:   Diagnosis Date   • Arthritis     osteo, knees   • Coronary artery disease due to lipid rich plaque    • S/P primary angioplasty with coronary stent 4/8/2020   • STEMI involving left anterior descending coronary artery (HCC) 4/8/2020     Past Surgical History:   Procedure Laterality Date   • PB TOTAL KNEE ARTHROPLASTY Right 5/9/2019    Procedure: KNEE ARTHROPLASTY TOTAL;  Surgeon: Greg Red M.D.;  Location: SURGERY [facility];  Service: Orthopedics   • CARPAL TUNNEL ENDOSCOPIC  11/5/2013    Performed by Toni Elizabeth M.D. at SURGERY [facility]   • KNEE ARTHROSCOPY Right 2012   • CARPAL TUNNEL RELEASE  2011    right   • ENT SURGERY  2004    vocal cord lesion excision   • ACL REPAIR  1997    right     Family History   Problem Relation Age of Onset   • Hypertension Father      Social History     Socioeconomic History   • Marital status:      Spouse name: Not on file   • Number of children: Not on file   • Years of education: Not on file   • Highest education level: Not on file   Occupational History   • Not on file   Social Needs   • Financial resource strain: Not on file   • Food insecurity     Worry: Not on file     Inability: Not on file   • Transportation needs     Medical: Not on file     Non-medical: Not on file    Tobacco Use   • Smoking status: Never Smoker   • Smokeless tobacco: Never Used   Substance and Sexual Activity   • Alcohol use: Yes     Comment: 2 drinks per week   • Drug use: No   • Sexual activity: Not on file   Lifestyle   • Physical activity     Days per week: Not on file     Minutes per session: Not on file   • Stress: Not on file   Relationships   • Social connections     Talks on phone: Not on file     Gets together: Not on file     Attends Synagogue service: Not on file     Active member of club or organization: Not on file     Attends meetings of clubs or organizations: Not on file     Relationship status: Not on file   • Intimate partner violence     Fear of current or ex partner: Not on file     Emotionally abused: Not on file     Physically abused: Not on file     Forced sexual activity: Not on file   Other Topics Concern   • Not on file   Social History Narrative   • Not on file     No Known Allergies  Outpatient Encounter Medications as of 11/19/2020   Medication Sig Dispense Refill   • atorvastatin (LIPITOR) 80 MG tablet Take 80 mg by mouth every day.     • carvedilol (COREG) 6.25 MG Tab 2 Times a Day.     • lisinopril (PRINIVIL) 5 MG Tab      • nitroglycerin (NITROSTAT) 0.4 MG SL Tab Place 0.4 mg under tongue.     • prasugrel (EFFIENT) 10 MG Tab      • aspirin EC (ECOTRIN) 81 MG Tablet Delayed Response Take 81 mg by mouth every day.     • Omega-3 Fatty Acids (FISH OIL) 1000 MG Cap capsule Take 1 Cap by mouth 2 Times a Day.     • nitroglycerin (NITROLINGUAL) 0.4 MG/SPRAY Solution Place 1 Spray under tongue as needed. 1 Bottle 11     No facility-administered encounter medications on file as of 11/19/2020.      Review of Systems   Constitutional: Negative for chills and fever.   HENT: Negative for sore throat.    Eyes: Negative for blurred vision.   Respiratory: Negative for cough and shortness of breath.    Cardiovascular: Negative for chest pain, palpitations, claudication, leg swelling and PND.  "  Gastrointestinal: Negative for abdominal pain and nausea.   Musculoskeletal: Negative for falls and joint pain.   Skin: Negative for rash.   Neurological: Negative for dizziness, focal weakness and weakness.   Endo/Heme/Allergies: Does not bruise/bleed easily.        Objective:   BP (!) 0/0 (BP Location: Left arm, Patient Position: Sitting, BP Cuff Size: Adult)   Ht 1.88 m (6' 2\")   Wt 92.1 kg (203 lb)   BMI 26.06 kg/m²     Physical Exam   Vital signs are reported by the patient    General appears well  Eyes no icterus  Extremities no edema  Skin no apparent rashes    This encounter was conducted via [employer]  Video Virtual Visit.   Verbal consent was obtained. Patient's identity was verified.    We reviewed in person the most recent labs  Recent Results (from the past 4032 hour(s))   Exercise With/Without ECG    Collection Time: 20 11:06 AM   Result Value Ref Range    Report       [facility] Cardiology Intensive Cardiac Rehab    Test Date:  2020  Pt Name:    [place]ISSY [place]                Department: [facility]  MRN:        [id number]                      Room:  Gender:     Male                         Technician:  :        1970                   Requested By:BERTA KAYE  Order #:    [id number]                    Devan MD: Penelope Scott      Interpretive Statements      Electronically Reviewed On 2020 13:06:01 PDT by Staff Member Penelope Scott     Exercise With/Without ECG    Collection Time: 20  8:31 AM   Result Value Ref Range    Report       [facility] Cardiology Intensive Cardiac Rehab    Test Date:  2020  Pt Name:    CHRISSYMAGGY LANDRYMartins[place]                Department: [facility]  MRN:        [id number]                      Room:  Gender:     Male                         Technician:  :        1970                   Requested By:BERTA KAYE  Order #:    [id number]                    Devan MD: Penelope Scott      Interpretive Statements      Electronically Reviewed On 2020 10:21:18 " PDT by Staff Member Penelope Scott     Exercise With/Without ECG    Collection Time: 20  9:00 AM   Result Value Ref Range    Report       [facility] Cardiology Intensive Cardiac Rehab    Test Date:  2020  Pt Name:    CHRISSY SANTOS                Department: [facility]  MRN:        [id number]                      Room:  Gender:     Male                         Technician:  :        1970                   Requested By:BERTA KAYE  Order #:    [id number]                    Reading MD: Stephanie Keller      Interpretive Statements      Electronically Reviewed On 2020 10:54:45 PDT by Staff Member Stephanie Keller           Assessment:     1. Coronary artery disease due to lipid rich plaque     2. S/P primary angioplasty with coronary stent     3. History of ST elevation myocardial infarction (STEMI) - 3/2020 LAD     4. Dyslipidemia         Medical Decision Making:  Today's Assessment / Status / Plan:     It was my pleasure to meet with Mr. Santos.    Blood pressure is well controlled.  We specifically assessed the labs on hypertension treatment    He is on appropriate statin.    He will have an echocardiogram in December    He is updated his labs well controlled dyslipidemia on high-dose statin    We specifically discussed and he understands the importance of dual antiplatelet therapy as well as the risk.  Likely defer colonoscopy and other elective things till a year after the stenting although he could stop antiplatelet short-term now if ever necessary    I will see Mr. Santos back in 1 year time and encouraged him to follow up with us over the phone or electronically using my [employer] as issues arise.    It is my pleasure to participate in the care of Mr. Santos.  Please do not hesitate to contact me with questions or concerns.    Beau Salomon MD PhD FACC  Cardiologist [facility]    Please note that this dictation was created using voice recognition software.  There may be errors I did not discover before finalizing the note.

## 2020-12-16 DIAGNOSIS — Z23 NEED FOR VACCINATION: ICD-10-CM

## 2020-12-17 ENCOUNTER — HOSPITAL ENCOUNTER (OUTPATIENT)
Dept: CARDIOLOGY | Facility: MEDICAL CENTER | Age: 50
End: 2020-12-17
Attending: INTERNAL MEDICINE
Payer: COMMERCIAL

## 2020-12-17 ENCOUNTER — TELEPHONE (OUTPATIENT)
Dept: CARDIOLOGY | Facility: MEDICAL CENTER | Age: 50
End: 2020-12-17

## 2020-12-17 DIAGNOSIS — I25.83 CORONARY ARTERY DISEASE DUE TO LIPID RICH PLAQUE: ICD-10-CM

## 2020-12-17 DIAGNOSIS — I25.10 CORONARY ARTERY DISEASE DUE TO LIPID RICH PLAQUE: ICD-10-CM

## 2020-12-17 LAB
LV EJECT FRACT  99904: 55
LV EJECT FRACT MOD 2C 99903: 58.75
LV EJECT FRACT MOD 4C 99902: 52.8
LV EJECT FRACT MOD BP 99901: 56.19

## 2020-12-17 PROCEDURE — 93306 TTE W/DOPPLER COMPLETE: CPT | Mod: 26 | Performed by: INTERNAL MEDICINE

## 2020-12-17 PROCEDURE — 93306 TTE W/DOPPLER COMPLETE: CPT

## 2020-12-20 ENCOUNTER — APPOINTMENT (OUTPATIENT)
Dept: FAMILY PLANNING/WOMEN'S HEALTH CLINIC | Facility: IMMUNIZATION CENTER | Age: 50
End: 2020-12-20
Attending: FAMILY MEDICINE
Payer: COMMERCIAL

## 2020-12-20 ENCOUNTER — IMMUNIZATION (OUTPATIENT)
Dept: FAMILY PLANNING/WOMEN'S HEALTH CLINIC | Facility: IMMUNIZATION CENTER | Age: 50
End: 2020-12-20
Payer: COMMERCIAL

## 2020-12-20 DIAGNOSIS — Z23 ENCOUNTER FOR VACCINATION: Primary | ICD-10-CM

## 2020-12-20 DIAGNOSIS — Z23 NEED FOR VACCINATION: ICD-10-CM

## 2020-12-20 PROCEDURE — 0001A PFIZER SARS-COV-2 VACCINE: CPT

## 2020-12-20 PROCEDURE — 91300 PFIZER SARS-COV-2 VACCINE: CPT

## 2020-12-22 ENCOUNTER — HOSPITAL ENCOUNTER (OUTPATIENT)
Dept: LAB | Facility: MEDICAL CENTER | Age: 50
End: 2020-12-22
Attending: INTERNAL MEDICINE
Payer: COMMERCIAL

## 2020-12-22 ENCOUNTER — HOSPITAL ENCOUNTER (OUTPATIENT)
Dept: LAB | Facility: MEDICAL CENTER | Age: 50
End: 2020-12-22
Attending: EMERGENCY MEDICINE
Payer: COMMERCIAL

## 2020-12-22 LAB
25(OH)D3 SERPL-MCNC: 43 NG/ML (ref 30–100)
ALBUMIN SERPL BCP-MCNC: 4.2 G/DL (ref 3.2–4.9)
ALBUMIN SERPL BCP-MCNC: 4.3 G/DL (ref 3.2–4.9)
ALBUMIN/GLOB SERPL: 1.6 G/DL
ALBUMIN/GLOB SERPL: 1.6 G/DL
ALP SERPL-CCNC: 73 U/L (ref 30–99)
ALP SERPL-CCNC: 74 U/L (ref 30–99)
ALT SERPL-CCNC: 31 U/L (ref 2–50)
ALT SERPL-CCNC: 32 U/L (ref 2–50)
ANION GAP SERPL CALC-SCNC: 8 MMOL/L (ref 7–16)
ANION GAP SERPL CALC-SCNC: 8 MMOL/L (ref 7–16)
APPEARANCE UR: CLEAR
AST SERPL-CCNC: 17 U/L (ref 12–45)
AST SERPL-CCNC: 18 U/L (ref 12–45)
BASOPHILS # BLD AUTO: 0.5 % (ref 0–1.8)
BASOPHILS # BLD: 0.02 K/UL (ref 0–0.12)
BILIRUB SERPL-MCNC: 0.8 MG/DL (ref 0.1–1.5)
BILIRUB SERPL-MCNC: 0.8 MG/DL (ref 0.1–1.5)
BILIRUB UR QL STRIP.AUTO: NEGATIVE
BUN SERPL-MCNC: 16 MG/DL (ref 8–22)
BUN SERPL-MCNC: 16 MG/DL (ref 8–22)
CALCIUM SERPL-MCNC: 9.7 MG/DL (ref 8.5–10.5)
CALCIUM SERPL-MCNC: 9.7 MG/DL (ref 8.5–10.5)
CHLORIDE SERPL-SCNC: 103 MMOL/L (ref 96–112)
CHLORIDE SERPL-SCNC: 105 MMOL/L (ref 96–112)
CHOLEST SERPL-MCNC: 127 MG/DL (ref 100–199)
CHOLEST SERPL-MCNC: 129 MG/DL (ref 100–199)
CO2 SERPL-SCNC: 25 MMOL/L (ref 20–33)
CO2 SERPL-SCNC: 25 MMOL/L (ref 20–33)
COLOR UR: YELLOW
CORTIS SERPL-MCNC: 14.7 UG/DL (ref 0–23)
CREAT SERPL-MCNC: 0.94 MG/DL (ref 0.5–1.4)
CREAT SERPL-MCNC: 0.97 MG/DL (ref 0.5–1.4)
CRP SERPL HS-MCNC: 0.4 MG/L (ref 0–7.5)
DHEA-S SERPL-MCNC: 34.4 UG/DL (ref 44.3–331)
EOSINOPHIL # BLD AUTO: 0.09 K/UL (ref 0–0.51)
EOSINOPHIL NFR BLD: 2.3 % (ref 0–6.9)
ERYTHROCYTE [DISTWIDTH] IN BLOOD BY AUTOMATED COUNT: 42.1 FL (ref 35.9–50)
EST. AVERAGE GLUCOSE BLD GHB EST-MCNC: 105 MG/DL
ESTRADIOL SERPL-MCNC: 28.3 PG/ML
FASTING STATUS PATIENT QL REPORTED: NORMAL
FASTING STATUS PATIENT QL REPORTED: NORMAL
GGT SERPL-CCNC: 32 U/L (ref 7–51)
GLOBULIN SER CALC-MCNC: 2.6 G/DL (ref 1.9–3.5)
GLOBULIN SER CALC-MCNC: 2.7 G/DL (ref 1.9–3.5)
GLUCOSE SERPL-MCNC: 112 MG/DL (ref 65–99)
GLUCOSE SERPL-MCNC: 112 MG/DL (ref 65–99)
GLUCOSE UR STRIP.AUTO-MCNC: NEGATIVE MG/DL
HBA1C MFR BLD: 5.3 % (ref 0–5.6)
HCT VFR BLD AUTO: 47.6 % (ref 42–52)
HCYS SERPL-SCNC: 9.1 UMOL/L
HDLC SERPL-MCNC: 56 MG/DL
HDLC SERPL-MCNC: 57 MG/DL
HGB BLD-MCNC: 16.2 G/DL (ref 14–18)
IMM GRANULOCYTES # BLD AUTO: 0.01 K/UL (ref 0–0.11)
IMM GRANULOCYTES NFR BLD AUTO: 0.3 % (ref 0–0.9)
KETONES UR STRIP.AUTO-MCNC: NEGATIVE MG/DL
LDH SERPL L TO P-CCNC: 66 U/L (ref 107–266)
LDLC SERPL CALC-MCNC: 49 MG/DL
LDLC SERPL CALC-MCNC: 52 MG/DL
LEUKOCYTE ESTERASE UR QL STRIP.AUTO: NEGATIVE
LYMPHOCYTES # BLD AUTO: 1.41 K/UL (ref 1–4.8)
LYMPHOCYTES NFR BLD: 35.9 % (ref 22–41)
MCH RBC QN AUTO: 31.1 PG (ref 27–33)
MCHC RBC AUTO-ENTMCNC: 34 G/DL (ref 33.7–35.3)
MCV RBC AUTO: 91.4 FL (ref 81.4–97.8)
MICRO URNS: NORMAL
MONOCYTES # BLD AUTO: 0.33 K/UL (ref 0–0.85)
MONOCYTES NFR BLD AUTO: 8.4 % (ref 0–13.4)
NEUTROPHILS # BLD AUTO: 2.07 K/UL (ref 1.82–7.42)
NEUTROPHILS NFR BLD: 52.6 % (ref 44–72)
NITRITE UR QL STRIP.AUTO: NEGATIVE
NRBC # BLD AUTO: 0 K/UL
NRBC BLD-RTO: 0 /100 WBC
PH UR STRIP.AUTO: 6 [PH] (ref 5–8)
PHOSPHATE SERPL-MCNC: 2.7 MG/DL (ref 2.5–4.5)
PLATELET # BLD AUTO: 208 K/UL (ref 164–446)
PMV BLD AUTO: 9.6 FL (ref 9–12.9)
POTASSIUM SERPL-SCNC: 4.2 MMOL/L (ref 3.6–5.5)
POTASSIUM SERPL-SCNC: 4.3 MMOL/L (ref 3.6–5.5)
PROT SERPL-MCNC: 6.8 G/DL (ref 6–8.2)
PROT SERPL-MCNC: 7 G/DL (ref 6–8.2)
PROT UR QL STRIP: NEGATIVE MG/DL
PSA SERPL-MCNC: 0.67 NG/ML (ref 0–4)
RBC # BLD AUTO: 5.21 M/UL (ref 4.7–6.1)
RBC UR QL AUTO: NEGATIVE
SODIUM SERPL-SCNC: 136 MMOL/L (ref 135–145)
SODIUM SERPL-SCNC: 138 MMOL/L (ref 135–145)
SP GR UR STRIP.AUTO: 1.02
T3FREE SERPL-MCNC: 3.13 PG/ML (ref 2–4.4)
T4 FREE SERPL-MCNC: 1.14 NG/DL (ref 0.93–1.7)
TRIGL SERPL-MCNC: 103 MG/DL (ref 0–149)
TRIGL SERPL-MCNC: 106 MG/DL (ref 0–149)
TSH SERPL DL<=0.005 MIU/L-ACNC: 1.23 UIU/ML (ref 0.38–5.33)
URATE SERPL-MCNC: 5.4 MG/DL (ref 2.5–8.3)
UROBILINOGEN UR STRIP.AUTO-MCNC: 0.2 MG/DL
VIT B12 SERPL-MCNC: 703 PG/ML (ref 211–911)
WBC # BLD AUTO: 3.9 K/UL (ref 4.8–10.8)

## 2020-12-22 PROCEDURE — 80061 LIPID PANEL: CPT | Mod: 91

## 2020-12-22 PROCEDURE — 82670 ASSAY OF TOTAL ESTRADIOL: CPT

## 2020-12-22 PROCEDURE — 82533 TOTAL CORTISOL: CPT

## 2020-12-22 PROCEDURE — 82306 VITAMIN D 25 HYDROXY: CPT

## 2020-12-22 PROCEDURE — 83525 ASSAY OF INSULIN: CPT

## 2020-12-22 PROCEDURE — 83036 HEMOGLOBIN GLYCOSYLATED A1C: CPT

## 2020-12-22 PROCEDURE — 81003 URINALYSIS AUTO W/O SCOPE: CPT

## 2020-12-22 PROCEDURE — 80053 COMPREHEN METABOLIC PANEL: CPT

## 2020-12-22 PROCEDURE — 84153 ASSAY OF PSA TOTAL: CPT

## 2020-12-22 PROCEDURE — 83615 LACTATE (LD) (LDH) ENZYME: CPT

## 2020-12-22 PROCEDURE — 84481 FREE ASSAY (FT-3): CPT

## 2020-12-22 PROCEDURE — 84443 ASSAY THYROID STIM HORMONE: CPT

## 2020-12-22 PROCEDURE — 82627 DEHYDROEPIANDROSTERONE: CPT

## 2020-12-22 PROCEDURE — 80053 COMPREHEN METABOLIC PANEL: CPT | Mod: 91

## 2020-12-22 PROCEDURE — 85025 COMPLETE CBC W/AUTO DIFF WBC: CPT

## 2020-12-22 PROCEDURE — 36415 COLL VENOUS BLD VENIPUNCTURE: CPT

## 2020-12-22 PROCEDURE — 80061 LIPID PANEL: CPT

## 2020-12-22 PROCEDURE — 84270 ASSAY OF SEX HORMONE GLOBUL: CPT

## 2020-12-22 PROCEDURE — 86141 C-REACTIVE PROTEIN HS: CPT

## 2020-12-22 PROCEDURE — 84550 ASSAY OF BLOOD/URIC ACID: CPT

## 2020-12-22 PROCEDURE — 84100 ASSAY OF PHOSPHORUS: CPT

## 2020-12-22 PROCEDURE — 84403 ASSAY OF TOTAL TESTOSTERONE: CPT

## 2020-12-22 PROCEDURE — 84439 ASSAY OF FREE THYROXINE: CPT

## 2020-12-22 PROCEDURE — 83090 ASSAY OF HOMOCYSTEINE: CPT

## 2020-12-22 PROCEDURE — 82977 ASSAY OF GGT: CPT

## 2020-12-22 PROCEDURE — 84402 ASSAY OF FREE TESTOSTERONE: CPT

## 2020-12-22 PROCEDURE — 82607 VITAMIN B-12: CPT

## 2020-12-22 PROCEDURE — 84305 ASSAY OF SOMATOMEDIN: CPT

## 2020-12-24 LAB
IGF-I SERPL-MCNC: 191 NG/ML (ref 67–225)
IGF-I Z-SCORE SERPL: 1.3
INSULIN P FAST SERPL-ACNC: 9 UIU/ML (ref 3–19)
SHBG SERPL-SCNC: 50 NMOL/L (ref 11–80)
TESTOST FREE MFR SERPL: 1.5 % (ref 1.6–2.9)
TESTOST FREE SERPL-MCNC: 66 PG/ML (ref 47–244)
TESTOST SERPL-MCNC: 455 NG/DL (ref 300–890)

## 2021-01-10 ENCOUNTER — IMMUNIZATION (OUTPATIENT)
Dept: FAMILY PLANNING/WOMEN'S HEALTH CLINIC | Facility: IMMUNIZATION CENTER | Age: 51
End: 2021-01-10
Attending: FAMILY MEDICINE
Payer: COMMERCIAL

## 2021-01-10 DIAGNOSIS — Z23 ENCOUNTER FOR VACCINATION: Primary | ICD-10-CM

## 2021-01-10 PROCEDURE — 91300 PFIZER SARS-COV-2 VACCINE: CPT

## 2021-01-10 PROCEDURE — 0002A PFIZER SARS-COV-2 VACCINE: CPT

## 2021-03-31 ENCOUNTER — HOSPITAL ENCOUNTER (OUTPATIENT)
Dept: LAB | Facility: MEDICAL CENTER | Age: 51
End: 2021-03-31
Attending: EMERGENCY MEDICINE
Payer: COMMERCIAL

## 2021-03-31 LAB — TROPONIN T SERPL-MCNC: <6 NG/L (ref 6–19)

## 2021-03-31 PROCEDURE — 36415 COLL VENOUS BLD VENIPUNCTURE: CPT

## 2021-03-31 PROCEDURE — 84484 ASSAY OF TROPONIN QUANT: CPT

## 2021-05-18 ENCOUNTER — TELEPHONE (OUTPATIENT)
Dept: CARDIOLOGY | Facility: MEDICAL CENTER | Age: 51
End: 2021-05-18

## 2021-05-18 NOTE — TELEPHONE ENCOUNTER
Spoke with Dr Buenrostro and Dr Nava with sleep issues     Reasonable to wean or stop carvedilol    Could also be COVID    He is planning on upcoming colonoscopy, please send a clearance to [facility] that he can stop DAPT prior to procedure    It is my pleasure to participate in the care of Mr. Nava.  Please do not hesitate to contact me with questions or concerns.    Beau Salomon MD PhD [facility]  Cardiologist [facility]    Please note that this dictation was created using voice recognition software. There may be errors I did not discover before finalizing the note.     5/18/2021  1:36 PM

## 2021-05-19 NOTE — TELEPHONE ENCOUNTER
Upon chart review, unable to locate clearance request.  Reviewed recent faxes received, unable to locate clearance request.    Called [facility] to request for fax number, received fax number from representative.    Telephone note signed by MD printed and faxed to [facility], [phone], completed status.

## 2021-06-25 ENCOUNTER — TELEPHONE (OUTPATIENT)
Dept: CARDIOLOGY | Facility: MEDICAL CENTER | Age: 51
End: 2021-06-25

## 2021-06-25 NOTE — TELEPHONE ENCOUNTER
Received fax from [facility] (P: [phone] F: [phone]) requesting:    - cardiac clearance for upcoming Colonoscopy and EGD scheduled 7/20/2021.  - Medication hold __ days prior to procedure.    Reviewed pt chart and telephone note signed by MD printed and re-faxed to, above number, completed status.    Called pt and s/w pt wife, Jennifer to review MD recommendations.  She verbalizes understanding and states no other concerns or questions at this time.  Jennifer is appreciative of information given.    Fax sent to scanning for reference via [email or web address], completed status received.

## 2021-09-12 ENCOUNTER — HOSPITAL ENCOUNTER (OUTPATIENT)
Facility: MEDICAL CENTER | Age: 51
End: 2021-09-12
Attending: PEDIATRICS
Payer: COMMERCIAL

## 2021-09-12 LAB — COVID ORDER STATUS COVID19: NORMAL

## 2021-09-12 PROCEDURE — U0003 INFECTIOUS AGENT DETECTION BY NUCLEIC ACID (DNA OR RNA); SEVERE ACUTE RESPIRATORY SYNDROME CORONAVIRUS 2 (SARS-COV-2) (CORONAVIRUS DISEASE [COVID-19]), AMPLIFIED PROBE TECHNIQUE, MAKING USE OF HIGH THROUGHPUT TECHNOLOGIES AS DESCRIBED BY CMS-2020-01-R: HCPCS

## 2021-09-12 PROCEDURE — U0005 INFEC AGEN DETEC AMPLI PROBE: HCPCS

## 2021-09-13 LAB
SARS-COV-2 RNA RESP QL NAA+PROBE: NOTDETECTED
SPECIMEN SOURCE: NORMAL

## 2021-09-22 ENCOUNTER — IMMUNIZATION (OUTPATIENT)
Dept: OCCUPATIONAL MEDICINE | Facility: CLINIC | Age: 51
End: 2021-09-22

## 2021-09-22 DIAGNOSIS — Z23 NEED FOR VACCINATION: Primary | ICD-10-CM

## 2021-09-28 PROCEDURE — 90686 IIV4 VACC NO PRSV 0.5 ML IM: CPT | Performed by: PREVENTIVE MEDICINE

## 2021-09-29 ENCOUNTER — IMMUNIZATION (OUTPATIENT)
Dept: OCCUPATIONAL MEDICINE | Facility: CLINIC | Age: 51
End: 2021-09-29

## 2021-09-29 DIAGNOSIS — Z23 ENCOUNTER FOR VACCINATION: Primary | ICD-10-CM

## 2021-09-29 PROCEDURE — 91300 PFIZER SARS-COV-2 VACCINE: CPT | Performed by: INTERNAL MEDICINE

## 2021-09-29 PROCEDURE — 0003A PFIZER SARS-COV-2 VACCINE: CPT | Performed by: INTERNAL MEDICINE

## 2022-10-06 ENCOUNTER — IMMUNIZATION (OUTPATIENT)
Dept: OCCUPATIONAL MEDICINE | Facility: CLINIC | Age: 52
End: 2022-10-06

## 2022-10-06 DIAGNOSIS — Z23 NEED FOR VACCINATION: Primary | ICD-10-CM

## 2022-10-06 PROCEDURE — 90686 IIV4 VACC NO PRSV 0.5 ML IM: CPT | Performed by: NURSE PRACTITIONER

## 2023-01-12 ENCOUNTER — TELEMEDICINE (OUTPATIENT)
Dept: CARDIOLOGY | Facility: MEDICAL CENTER | Age: 53
End: 2023-01-12
Payer: COMMERCIAL

## 2023-01-12 VITALS — SYSTOLIC BLOOD PRESSURE: 125 MMHG | DIASTOLIC BLOOD PRESSURE: 82 MMHG

## 2023-01-12 DIAGNOSIS — Z95.5 S/P PRIMARY ANGIOPLASTY WITH CORONARY STENT: Chronic | ICD-10-CM

## 2023-01-12 DIAGNOSIS — I25.10 CORONARY ARTERY DISEASE DUE TO LIPID RICH PLAQUE: Chronic | ICD-10-CM

## 2023-01-12 DIAGNOSIS — I25.2 HISTORY OF ST ELEVATION MYOCARDIAL INFARCTION (STEMI): Chronic | ICD-10-CM

## 2023-01-12 DIAGNOSIS — I25.83 CORONARY ARTERY DISEASE DUE TO LIPID RICH PLAQUE: Chronic | ICD-10-CM

## 2023-01-12 DIAGNOSIS — E78.5 DYSLIPIDEMIA: Chronic | ICD-10-CM

## 2023-01-12 PROCEDURE — 99214 OFFICE O/P EST MOD 30 MIN: CPT | Mod: 95 | Performed by: INTERNAL MEDICINE

## 2023-01-12 RX ORDER — CLOPIDOGREL BISULFATE 75 MG/1
75 TABLET ORAL DAILY
Qty: 100 TABLET | Refills: 3 | Status: SHIPPED | OUTPATIENT
Start: 2023-01-12

## 2023-01-12 ASSESSMENT — ENCOUNTER SYMPTOMS
WEAKNESS: 0
DIZZINESS: 0
FALLS: 0
FEVER: 0
CHILLS: 0
COUGH: 0
SORE THROAT: 0
BLURRED VISION: 0
BRUISES/BLEEDS EASILY: 0
PALPITATIONS: 0
FOCAL WEAKNESS: 0
CLAUDICATION: 0
PND: 0
ABDOMINAL PAIN: 0
SHORTNESS OF BREATH: 0
NAUSEA: 0

## 2023-01-12 NOTE — PROGRESS NOTES
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 today for follow-up of history of bradycardia status post MI in March 2020    Is done well he had an episode of chest discomfort 2 months ago self resolved    He is concerned about antiplatelet therapy given he has a low back injury activity    He is emergency room physician here    Past Medical History:   Diagnosis Date    Arthritis     osteo, knees    Coronary artery disease due to lipid rich plaque     Dyslipidemia     History of ST elevation myocardial infarction (STEMI) - 3/2020 LAD     S/P primary angioplasty with coronary stent 4/8/2020    STEMI involving left anterior descending coronary artery (HCC) 4/8/2020     Past Surgical History:   Procedure Laterality Date    PB TOTAL KNEE ARTHROPLASTY Right 5/9/2019    Procedure: KNEE ARTHROPLASTY TOTAL;  Surgeon: Greg Red M.D.;  Location: SURGERY [facility];  Service: Orthopedics    CARPAL TUNNEL ENDOSCOPIC  11/5/2013    Performed by Toni Elizabeth M.D. at SURGERY [facility]    KNEE ARTHROSCOPY Right 2012    CARPAL TUNNEL RELEASE  2011    right    ENT SURGERY  2004    vocal cord lesion excision    REPAIR, KNEE, ACL  1997    right     Family History   Problem Relation Age of Onset    Hypertension Father      Social History     Socioeconomic History    Marital status:      Spouse name: Not on file    Number of children: Not on file    Years of education: Not on file    Highest education level: Not on file   Occupational History    Not on file   Tobacco Use    Smoking status: Never    Smokeless tobacco: Never   Vaping Use    Vaping Use: Never used   Substance and Sexual Activity    Alcohol use: Yes     Comment: 2 drinks per week    Drug use: No    Sexual activity: Not on file   Other Topics Concern    Not on file   Social History Narrative    Not on file     Social Determinants of Health     Financial Resource Strain:  Not on file   Food Insecurity: Not on file   Transportation Needs: Not on file   Physical Activity: Not on file   Stress: Not on file   Social Connections: Not on file   Intimate Partner Violence: Not on file   Housing Stability: Not on file     No Known Allergies  Outpatient Encounter Medications as of 1/12/2023   Medication Sig Dispense Refill    atorvastatin (LIPITOR) 80 MG tablet Take 80 mg by mouth every day.      carvedilol (COREG) 6.25 MG Tab 2 Times a Day.      lisinopril (PRINIVIL) 5 MG Tab       nitroglycerin (NITROSTAT) 0.4 MG SL Tab Place 0.4 mg under tongue.      prasugrel (EFFIENT) 10 MG Tab       aspirin EC (ECOTRIN) 81 MG Tablet Delayed Response Take 81 mg by mouth every day.      Omega-3 Fatty Acids (FISH OIL) 1000 MG Cap capsule Take 1 Cap by mouth 2 Times a Day.      nitroglycerin (NITROLINGUAL) 0.4 MG/SPRAY Solution Place 1 Spray under tongue as needed. 1 Bottle 11     No facility-administered encounter medications on file as of 1/12/2023.     Review of Systems   Constitutional:  Negative for chills and fever.   HENT:  Negative for sore throat.    Eyes:  Negative for blurred vision.   Respiratory:  Negative for cough and shortness of breath.    Cardiovascular:  Negative for chest pain, palpitations, claudication, leg swelling and PND.   Gastrointestinal:  Negative for abdominal pain and nausea.   Musculoskeletal:  Negative for falls and joint pain.   Skin:  Negative for rash.   Neurological:  Negative for dizziness, focal weakness and weakness.   Endo/Heme/Allergies:  Does not bruise/bleed easily.            Objective     /82     Physical Exam       Vital signs are reported by the patient    General appears well  Eyes no icterus  Extremities no edema  Skin no apparent rashes    This encounter was conducted via Zoom  Video Virtual Visit.   The patient was at home in the [facility].  Verbal consent was obtained. Patient's identity was verified.    We reviewed in person the most  recent labs    Assessment & Plan     1. S/P primary angioplasty with coronary stent  EC-ECHOCARDIOGRAM REST/STRESS W/O CONT      2. Coronary artery disease due to lipid rich plaque  EC-ECHOCARDIOGRAM REST/STRESS W/O CONT      3. Dyslipidemia        4. History of ST elevation myocardial infarction (STEMI) - 3/2020 LAD            Medical Decision Making: Today's Assessment/Status/Plan:        It was my pleasure to meet with Mr. Nava.    We addressed the management of hypertension at today's visit. Blood pressure is well controlled.  We specifically assessed the labs on hypertension treatment    We addressed the management of dyslipidemia and atherosclerosis at today's visit. He is on appropriate statin.    We addressed the management of atherosclerotic artery disease.  He is on proper antiplatelet, cholesterol management and beta-blockers as appropriate.  We addressed the potential side effects and laboratory follow-up for these medications.    We can stop his aspirin continue on Plavix monotherapy.  Per his echo chest effort given his been 2 years since his angioplasty reasonable to do stress imaging with a stress echo    I will see Mr. Nava back in 1 year time and encouraged him to follow up with us over the phone or electronically using my [employer] as issues arise.    It is my pleasure to participate in the care of Mr. Nava.  Please do not hesitate to contact me with questions or concerns.    Beau Salomon MD PhD FAC  Cardiologist [facility] for [facility] V[facility]    Please note that this dictation was created using voice recognition software. There may be errors I did not discover before finalizing the note.

## 2023-05-16 ENCOUNTER — HOSPITAL ENCOUNTER (OUTPATIENT)
Dept: CARDIOLOGY | Facility: MEDICAL CENTER | Age: 53
End: 2023-05-16
Attending: INTERNAL MEDICINE
Payer: COMMERCIAL

## 2023-05-16 DIAGNOSIS — I25.83 CORONARY ARTERY DISEASE DUE TO LIPID RICH PLAQUE: Chronic | ICD-10-CM

## 2023-05-16 DIAGNOSIS — Z95.5 S/P PRIMARY ANGIOPLASTY WITH CORONARY STENT: Chronic | ICD-10-CM

## 2023-05-16 DIAGNOSIS — I25.10 CORONARY ARTERY DISEASE DUE TO LIPID RICH PLAQUE: Chronic | ICD-10-CM

## 2023-05-16 PROCEDURE — 93017 CV STRESS TEST TRACING ONLY: CPT

## 2023-05-17 ENCOUNTER — TELEPHONE (OUTPATIENT)
Dept: CARDIOLOGY | Facility: MEDICAL CENTER | Age: 53
End: 2023-05-17

## 2023-05-17 LAB — LV EJECT FRACT  99904: 50

## 2023-05-17 PROCEDURE — 93018 CV STRESS TEST I&R ONLY: CPT | Performed by: INTERNAL MEDICINE

## 2023-05-17 PROCEDURE — 93350 STRESS TTE ONLY: CPT | Mod: 26 | Performed by: INTERNAL MEDICINE

## 2023-05-17 NOTE — TELEPHONE ENCOUNTER
Called and left message with stress echo result no ischemia small scar as expected    It is my pleasure to participate in the care of Mr. Nava.  Please do not hesitate to contact me with questions or concerns.    Beau Salomon MD PhD [facility]  Cardiologist [facility]    Please note that this dictation was created using voice recognition software. There may be errors I did not discover before finalizing the note.     5/17/2023  7:40 AM

## 2023-08-03 DIAGNOSIS — I25.83 CORONARY ARTERY DISEASE DUE TO LIPID RICH PLAQUE: ICD-10-CM

## 2023-08-03 DIAGNOSIS — I25.10 CORONARY ARTERY DISEASE DUE TO LIPID RICH PLAQUE: ICD-10-CM

## 2023-10-02 ENCOUNTER — IMMUNIZATION (OUTPATIENT)
Dept: OCCUPATIONAL MEDICINE | Facility: CLINIC | Age: 53
End: 2023-10-02

## 2023-10-02 DIAGNOSIS — Z23 NEED FOR VACCINATION: Primary | ICD-10-CM

## 2023-10-02 PROCEDURE — 90686 IIV4 VACC NO PRSV 0.5 ML IM: CPT | Performed by: PREVENTIVE MEDICINE

## 2024-09-26 ENCOUNTER — IMMUNIZATION (OUTPATIENT)
Dept: OCCUPATIONAL MEDICINE | Facility: CLINIC | Age: 54
End: 2024-09-26

## 2024-09-26 DIAGNOSIS — Z23 NEED FOR VACCINATION: Primary | ICD-10-CM

## 2024-09-26 PROCEDURE — 90656 IIV3 VACC NO PRSV 0.5 ML IM: CPT | Performed by: PREVENTIVE MEDICINE

## 2025-01-15 ENCOUNTER — PHARMACY VISIT (OUTPATIENT)
Dept: PHARMACY | Facility: MEDICAL CENTER | Age: 55
End: 2025-01-15
Payer: COMMERCIAL

## 2025-01-15 PROCEDURE — RXMED WILLOW AMBULATORY MEDICATION CHARGE: Performed by: INTERNAL MEDICINE

## 2025-01-15 RX ORDER — PREDNISONE 20 MG/1
TABLET ORAL
Qty: 30 TABLET | Refills: 0 | Status: SHIPPED | OUTPATIENT
Start: 2025-01-15 | End: 2025-01-24

## 2025-01-24 PROBLEM — M16.11 OSTEOARTHRITIS OF RIGHT HIP: Status: ACTIVE | Noted: 2025-01-24

## 2025-02-06 NOTE — PROGRESS NOTES
Chief Complaint   Patient presents with    Coronary Artery Disease     F/v Dx:Coronary artery disease due to lipid rich plaque       Subjective     Robby Nava Jr.,  is a 54 y.o. male who presents today for follow up on his cardiac care. He is a patient of Dr. Salomon and was last seen by telemedicine January 2023. 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     He has a past medical history of bradycardia, hyperlipidemia, hypertension, s/p MI in March 2020 which was found in the setting of backcountry skiing he presented to [facility] ED was on divert so he went to [place] where he was stented to the LAD. At his last visit in 2023, it was decided that he stop his aspirin continue on Plavix monotherapy. Stress imaging with a stress echo was ordered due to the chest discomfort he was experiencing - results showed a small scar area that was to be expected, good response to exercise.     Today: Robby presents today for surgical clearance for a right hip replacement in April with Dr. Red at [facility]. He is taking his medications as prescribed and has not missed any doses. He states that he feels great, skiing often and hiking. He has not needed his nitro, and requests that it be taken off his medication list. All of his labs/medications are prescribed and ordered by his PCP, Dr. Buenrostro. Blood pressure is controlled in office today, 108/80. He has been on telmisartan for the last two years, was previously on ACE-i, but due to cough, was switched over and has tolerated it well.     The patient denies any chest pain, pressure, tightness or discomfort; no palpitations; breathing is stable with no orthopnea or PND; no dizziness or syncope; no LE edema.     Past Medical History:   Diagnosis Date    Arthritis     osteo, knees    Coronary artery disease due to lipid rich plaque     Dyslipidemia     History of ST elevation myocardial infarction (STEMI) - 3/2020 LAD     S/P primary angioplasty with coronary  stent 4/8/2020    STEMI involving left anterior descending coronary artery (HCC) 4/8/2020     Past Surgical History:   Procedure Laterality Date    PB TOTAL KNEE ARTHROPLASTY Right 5/9/2019    Procedure: KNEE ARTHROPLASTY TOTAL;  Surgeon: Greg Red M.D.;  Location: SURGERY [facility];  Service: Orthopedics    CARPAL TUNNEL ENDOSCOPIC  11/5/2013    Performed by Toni Elizabeth M.D. at SURGERY [facility]    KNEE ARTHROSCOPY Right 2012    CARPAL TUNNEL RELEASE  2011    right    ENT SURGERY  2004    vocal cord lesion excision    REPAIR, KNEE, ACL  1997    right     Family History   Problem Relation Age of Onset    Hypertension Father      Social History     Socioeconomic History    Marital status:      Spouse name: Not on file    Number of children: Not on file    Years of education: Not on file    Highest education level: Not on file   Occupational History    Not on file   Tobacco Use    Smoking status: Never    Smokeless tobacco: Never   Vaping Use    Vaping status: Never Used   Substance and Sexual Activity    Alcohol use: Yes     Comment: 2 drinks per week    Drug use: No    Sexual activity: Not on file   Other Topics Concern    Not on file   Social History Narrative    Not on file     Social Drivers of Health     Financial Resource Strain: Not on file   Food Insecurity: Not on file   Transportation Needs: Not on file   Physical Activity: Not on file   Stress: Not on file   Social Connections: Not on file   Intimate Partner Violence: Not on file   Housing Stability: Not on file     No Known Allergies  Outpatient Encounter Medications as of 2/10/2025   Medication Sig Dispense Refill    telmisartan (MICARDIS) 20 MG tablet Take 20 mg by mouth every day.      clopidogrel (PLAVIX) 75 MG Tab Take 1 Tablet by mouth every day. 100 Tablet 3    atorvastatin (LIPITOR) 80 MG tablet Take 80 mg by mouth every day.      Omega-3 Fatty Acids (FISH OIL) 1000 MG Cap capsule Take 1 Cap by mouth 2 Times a Day.    "   [DISCONTINUED] nitroglycerin (NITROSTAT) 0.4 MG SL Tab Place 0.4 mg under tongue. (Patient not taking: Reported on 2/10/2025)      [DISCONTINUED] nitroglycerin (NITROLINGUAL) 0.4 MG/SPRAY Solution Place 1 Spray under tongue as needed. (Patient not taking: Reported on 2/10/2025) 1 Bottle 11     No facility-administered encounter medications on file as of 2/10/2025.     Review of Systems   Constitutional:  Negative for malaise/fatigue and weight loss.   Respiratory:  Negative for cough and shortness of breath.    Cardiovascular:  Negative for chest pain, palpitations and leg swelling.   Gastrointestinal:  Negative for nausea and vomiting.   Musculoskeletal:  Positive for joint pain (right hip).   Neurological:  Negative for dizziness and headaches.   Psychiatric/Behavioral:  The patient is not nervous/anxious.           Objective     /80 (BP Location: Left arm, Patient Position: Sitting, BP Cuff Size: Adult)   Pulse 85   Resp 16   Ht 1.88 m (6' 2\")   Wt 110 kg (243 lb)   SpO2 95%   BMI 31.20 kg/m²     Physical Exam  Constitutional:       Appearance: Normal appearance.   Cardiovascular:      Rate and Rhythm: Normal rate and regular rhythm.      Pulses: Normal pulses.      Heart sounds: Normal heart sounds.   Pulmonary:      Effort: Pulmonary effort is normal.      Breath sounds: Normal breath sounds.   Abdominal:      Palpations: Abdomen is soft.   Musculoskeletal:      Right lower leg: No edema.      Left lower leg: No edema.   Skin:     Findings: No bruising.   Neurological:      General: No focal deficit present.      Mental Status: He is alert and oriented to person, place, and time.   Psychiatric:         Mood and Affect: Mood normal.         Behavior: Behavior normal.            DIAGNOSTICS AND PROCEDURES    Stress Echo: 5/16/2023  CONCLUSIONS  Hypertensive blood pressure response to exercise.   Small scar of the apical septum with normal function at exercise.    No evidence for ischemia.    REST " ECHO  Mildly reduced left ventricular systolic function.   Segmental wall motion analysis is abnormal  Ejection fraction at rest is 55%.  Hypokinesis of the apical septum.    Per Dr. Salomon: Stress test shows small scar area that we expect but good response to exercise. BP was high with exercise, it was suggested he try extra lisinopril on days he exerts himself to see if his exercise tolerance is better.    Echocardiogram: 12/17/2020   CONCLUSIONS  Compared to the images of the study done 5- there has been   slight improvement in the estimated ejection fraction, previously 50%.  Left ventricular systolic function is mildly reduced.  Left ventricular ejection fraction is visually estimated to be 55%.  Hypokinesis of the basal and mid anteroseptal wall with thinning and   increased echogenicity consistent with prior LAD infarction.  Normal diastolic function.  Normal right ventricular size and systolic function.  No significant valve disease or flow abnormalities.     Echo 5/19/2020  Echocardiography Laboratory     CONCLUSIONS  No prior study is available for comparison.   Left ventricular systolic function is mildly depressed.  Left   ventricular ejection fraction is visually estimated to be 45-50%.  Mild   global hypokinesis.  Mild concentric left ventricular hypertrophy.    Grade I diastolic dysfunction.  Mild mitral regurgitation.       Assessment & Plan     1. S/P primary angioplasty with coronary stent        2. Coronary artery disease due to lipid rich plaque        3. Dyslipidemia        4. STEMI involving left anterior descending coronary artery (HCC)            Medical Decision Making: Today's Assessment/Status/Plan:          Medical clearance  -Pt is LOW risk for hip surgery with non-modifiable risk factor  -Reasonable to to proceed with surgery at [facility] surgery center  -Okay to hold Plavix (5) days prior to the hip replacement in April 2025  -EKG on 1/30 showed normal sinus  rhythm  -Revised Cardiac Risk Index for Pre-Operative Risk score of 1 (prior MI)  -Metabolic equivalent score (MET) above 4, he is very active, skiing and hiking with steep elevation without difficulty, chest pain, or shortness of breath    CAD  s/p stent to LAD March 2020  STEMI  -stent procedure done at [facility]  -stress echo 2023 showed a small scar area that was to be expected, good response to exercise  - continue Plavix monoptherapy  -continue lipitor 80 mg   - Does not need medication refills    Hyperlipidemia  -Most recent LDL 49, in 2020  -Continue lipitor 80mg   -Goal of less than 70  -Check lipid panel: per PCP    Hypertension  - Good control  - continue telmisartan 20mg  - goal < 130/80    I precaution the patient to return to the ER for difficulty breathing, recurrent worsening chest pain, not improving or concern.     Follow up with Dr. Salomon yearly. Continue to see PCP regularly for labs and medication refills.

## 2025-02-10 ENCOUNTER — OFFICE VISIT (OUTPATIENT)
Dept: CARDIOLOGY | Facility: MEDICAL CENTER | Age: 55
End: 2025-02-10
Attending: NURSE PRACTITIONER
Payer: COMMERCIAL

## 2025-02-10 VITALS
OXYGEN SATURATION: 95 % | WEIGHT: 243 LBS | SYSTOLIC BLOOD PRESSURE: 108 MMHG | HEART RATE: 85 BPM | BODY MASS INDEX: 31.18 KG/M2 | HEIGHT: 74 IN | RESPIRATION RATE: 16 BRPM | DIASTOLIC BLOOD PRESSURE: 80 MMHG

## 2025-02-10 DIAGNOSIS — Z95.5 S/P PRIMARY ANGIOPLASTY WITH CORONARY STENT: ICD-10-CM

## 2025-02-10 DIAGNOSIS — I25.10 CORONARY ARTERY DISEASE DUE TO LIPID RICH PLAQUE: ICD-10-CM

## 2025-02-10 DIAGNOSIS — I21.02 STEMI INVOLVING LEFT ANTERIOR DESCENDING CORONARY ARTERY (HCC): ICD-10-CM

## 2025-02-10 DIAGNOSIS — E78.5 DYSLIPIDEMIA: Chronic | ICD-10-CM

## 2025-02-10 DIAGNOSIS — I25.83 CORONARY ARTERY DISEASE DUE TO LIPID RICH PLAQUE: ICD-10-CM

## 2025-02-10 PROCEDURE — 3079F DIAST BP 80-89 MM HG: CPT | Performed by: NURSE PRACTITIONER

## 2025-02-10 PROCEDURE — 99211 OFF/OP EST MAY X REQ PHY/QHP: CPT | Performed by: NURSE PRACTITIONER

## 2025-02-10 PROCEDURE — 99214 OFFICE O/P EST MOD 30 MIN: CPT | Performed by: NURSE PRACTITIONER

## 2025-02-10 PROCEDURE — 3074F SYST BP LT 130 MM HG: CPT | Performed by: NURSE PRACTITIONER

## 2025-02-10 RX ORDER — TELMISARTAN 20 MG/1
20 TABLET ORAL DAILY
COMMUNITY
Start: 2025-01-16

## 2025-02-10 ASSESSMENT — ENCOUNTER SYMPTOMS
VOMITING: 0
COUGH: 0
NERVOUS/ANXIOUS: 0
NAUSEA: 0
SHORTNESS OF BREATH: 0
PALPITATIONS: 0
WEIGHT LOSS: 0
HEADACHES: 0
DIZZINESS: 0

## 2025-02-27 ENCOUNTER — OFFICE VISIT (OUTPATIENT)
Dept: CARDIOLOGY | Facility: MEDICAL CENTER | Age: 55
End: 2025-02-27
Attending: INTERNAL MEDICINE
Payer: COMMERCIAL

## 2025-02-27 VITALS
DIASTOLIC BLOOD PRESSURE: 72 MMHG | RESPIRATION RATE: 16 BRPM | SYSTOLIC BLOOD PRESSURE: 110 MMHG | OXYGEN SATURATION: 96 % | HEIGHT: 74 IN | BODY MASS INDEX: 31.44 KG/M2 | WEIGHT: 245 LBS | HEART RATE: 90 BPM

## 2025-02-27 DIAGNOSIS — Z95.5 S/P PRIMARY ANGIOPLASTY WITH CORONARY STENT: Chronic | ICD-10-CM

## 2025-02-27 DIAGNOSIS — I25.10 CORONARY ARTERY DISEASE DUE TO LIPID RICH PLAQUE: Chronic | ICD-10-CM

## 2025-02-27 DIAGNOSIS — I25.83 CORONARY ARTERY DISEASE DUE TO LIPID RICH PLAQUE: Chronic | ICD-10-CM

## 2025-02-27 DIAGNOSIS — I25.2 HISTORY OF ST ELEVATION MYOCARDIAL INFARCTION (STEMI): Chronic | ICD-10-CM

## 2025-02-27 DIAGNOSIS — E78.5 DYSLIPIDEMIA: Chronic | ICD-10-CM

## 2025-02-27 PROCEDURE — 99211 OFF/OP EST MAY X REQ PHY/QHP: CPT | Performed by: INTERNAL MEDICINE

## 2025-02-28 NOTE — PROGRESS NOTES
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 and was asking about his cardiovascular risks    He recently saw us for surgical clearance for hip arthroplasty    He reports excellent exercise tolerance and has had multiple high-altitude excursions without limitation    Past Medical History:   Diagnosis Date    Arthritis     osteo, knees    Coronary artery disease due to lipid rich plaque     Dyslipidemia     History of ST elevation myocardial infarction (STEMI) - 3/2020 LAD     S/P primary angioplasty with coronary stent 4/8/2020    STEMI involving left anterior descending coronary artery (HCC) 4/8/2020     Past Surgical History:   Procedure Laterality Date    PB TOTAL KNEE ARTHROPLASTY Right 5/9/2019    Procedure: KNEE ARTHROPLASTY TOTAL;  Surgeon: Greg Red M.D.;  Location: SURGERY [facility];  Service: Orthopedics    CARPAL TUNNEL ENDOSCOPIC  11/5/2013    Performed by Toni Elizabeth M.D. at SURGERY [facility]    KNEE ARTHROSCOPY Right 2012    CARPAL TUNNEL RELEASE  2011    right    ENT SURGERY  2004    vocal cord lesion excision    REPAIR, KNEE, ACL  1997    right     Family History   Problem Relation Age of Onset    Hypertension Father      Social History     Socioeconomic History    Marital status:      Spouse name: Not on file    Number of children: Not on file    Years of education: Not on file    Highest education level: Not on file   Occupational History    Not on file   Tobacco Use    Smoking status: Never    Smokeless tobacco: Never   Vaping Use    Vaping status: Never Used   Substance and Sexual Activity    Alcohol use: Yes     Comment: 2 drinks per week    Drug use: No    Sexual activity: Not  "on file   Other Topics Concern    Not on file   Social History Narrative    Not on file     Social Drivers of Health     Financial Resource Strain: Not on file   Food Insecurity: Not on file   Transportation Needs: Not on file   Physical Activity: Not on file   Stress: Not on file   Social Connections: Not on file   Intimate Partner Violence: Not on file   Housing Stability: Not on file     No Known Allergies  Outpatient Encounter Medications as of 2/27/2025   Medication Sig Dispense Refill    telmisartan (MICARDIS) 20 MG tablet Take 20 mg by mouth every day.      clopidogrel (PLAVIX) 75 MG Tab Take 1 Tablet by mouth every day. 100 Tablet 3    atorvastatin (LIPITOR) 80 MG tablet Take 80 mg by mouth every day.      Omega-3 Fatty Acids (FISH OIL) 1000 MG Cap capsule Take 1 Cap by mouth 2 Times a Day.       No facility-administered encounter medications on file as of 2/27/2025.     ROS           Objective     /72 (BP Location: Left arm, Patient Position: Sitting, BP Cuff Size: Adult)   Pulse 90   Resp 16   Ht 1.88 m (6' 2\")   Wt 111 kg (245 lb)   SpO2 96%   BMI 31.46 kg/m²     Physical Exam  Constitutional:       General: He is not in acute distress.     Appearance: He is not diaphoretic.   Eyes:      General: No scleral icterus.  Neck:      Vascular: No JVD.   Cardiovascular:      Rate and Rhythm: Normal rate.      Heart sounds: Normal heart sounds. No murmur heard.     No friction rub. No gallop.   Pulmonary:      Effort: No respiratory distress.      Breath sounds: No wheezing or rales.   Abdominal:      General: Bowel sounds are normal.      Palpations: Abdomen is soft.   Musculoskeletal:      Right lower leg: No edema.      Left lower leg: No edema.   Skin:     Findings: No rash.   Neurological:      Mental Status: He is alert. Mental status is at baseline.   Psychiatric:         Mood and Affect: Mood normal.            We reviewed in person the most recent labs  Recent Results (from the past 30 weeks) "   JULIANNE POC CMP    Collection Time: 01/30/25  3:59 PM   Result Value Ref Range    Alanine aminotransferase (ALT) 25 0 - 55    Albumin 3.6 3.3 - 5.5    Alkaline phosphatase 58 25 - 150    Aspartate aminotransferase (AST) 25 0 - 40    Calcium 9.1 8 - 10.3    Chloride 109 (H) 98 - 108    Creatinine 1.2 0.6 - 1.2    Glucose 117 75 - 120    Potassium 3.8 3.6 - 5.1    Sodium 138 133 - 145    Total bilirubin 0.7 0.2 - 1.6    Total carbon dioxide 23 18 - 33    Total protein 6.7 6.4 - 8.1    Blood Urea Nitrogen 17 7 - 22    eGFR           Assessment & Plan     1. S/P primary angioplasty with coronary stent        2. Coronary artery disease due to lipid rich plaque        3. History of ST elevation myocardial infarction (STEMI) - 3/2020 LAD        4. Dyslipidemia            Medical Decision Making: Today's Assessment/Status/Plan:        It was my pleasure to meet with Mr. Nava.    We addressed the management of hypertension at today's visit. Blood pressure is well controlled.  We specifically assessed the labs on hypertension treatment    We addressed the management of dyslipidemia and atherosclerosis at today's visit. He is on appropriate lipid lowering medication.    We addressed the management of atherosclerotic artery disease.  He is on proper antiplatelet, cholesterol management as appropriate.  We addressed the potential side effects and laboratory follow-up for these medications.    We discussed that he healed extremely well from his history of heart attack and has done well on medical therapy with good subsequent stress testing.  He reports good exercise tolerance and abilities at high altitudes in the past so I do not think he has any particular limitation of participating in an extended time at high altitude with appropriate preconditioning like any other person.  He will be sorting out whether he would address his hip prior to this depending on the recovery time.      I will see Mr. Nava back in 1 year  time and encouraged him to follow up with us over the phone or electronically using my MyChart as issues arise.    It is my pleasure to participate in the care of Mr. Nava.  Please do not hesitate to contact me with questions or concerns.    Beau Salomon MD PhD [facility]  Cardiologist [facility]    Please note that this dictation was created using voice recognition software. There may be errors I did not discover before finalizing the note.     () Today's E/M visit is associated with medical care services that serve as the continuing focal point for all needed health care services and/or with medical care services that  are part of ongoing care related to a patient's single, serious condition, or a complex condition: This includes  furnishing services to patients on an ongoing basis that result in care that is personalized  to the patient. The services result in a comprehensive, longitudinal, and continuous  relationship with the patient and involve delivery of team-based care that is accessible, coordinated with other practitioners and providers, and integrated with the broader health  care landscape.

## 2025-03-19 DIAGNOSIS — I25.83 CORONARY ARTERY DISEASE DUE TO LIPID RICH PLAQUE: ICD-10-CM

## 2025-03-19 DIAGNOSIS — I25.10 CORONARY ARTERY DISEASE DUE TO LIPID RICH PLAQUE: ICD-10-CM

## 2025-04-14 ENCOUNTER — PHARMACY VISIT (OUTPATIENT)
Dept: PHARMACY | Facility: MEDICAL CENTER | Age: 55
End: 2025-04-14
Payer: COMMERCIAL

## 2025-04-14 PROCEDURE — RXMED WILLOW AMBULATORY MEDICATION CHARGE: Performed by: PHYSICIAN ASSISTANT

## 2025-04-14 PROCEDURE — RXOTC WILLOW AMBULATORY OTC CHARGE: Performed by: PHARMACIST

## 2025-04-21 PROCEDURE — RXMED WILLOW AMBULATORY MEDICATION CHARGE: Performed by: PHYSICIAN ASSISTANT

## 2025-04-23 ENCOUNTER — PHARMACY VISIT (OUTPATIENT)
Dept: PHARMACY | Facility: MEDICAL CENTER | Age: 55
End: 2025-04-23
Payer: COMMERCIAL

## 2025-08-18 DIAGNOSIS — I25.83 CORONARY ARTERY DISEASE DUE TO LIPID RICH PLAQUE: ICD-10-CM

## 2025-08-18 DIAGNOSIS — I25.10 CORONARY ARTERY DISEASE DUE TO LIPID RICH PLAQUE: ICD-10-CM

## (undated) DEVICE — PROTECTOR ULNA NERVE - (36PR/CA)

## (undated) DEVICE — GLOVE BIOGEL INDICATOR SZ 8 SURGICAL PF LTX - (50/BX 4BX/CA)

## (undated) DEVICE — CANISTER SUCTION 3000ML MECHANICAL FILTER AUTO SHUTOFF MEDI-VAC NONSTERILE LF DISP  (40EA/CA)

## (undated) DEVICE — CUTTING BLOCKS

## (undated) DEVICE — BLOCK

## (undated) DEVICE — SODIUM CHL IRRIGATION 0.9% 1000ML (12EA/CA)

## (undated) DEVICE — SENSOR SPO2 NEO LNCS ADHESIVE (20/BX) SEE USER NOTES

## (undated) DEVICE — HANDPIECE 10FT INTPLS SCT PLS IRRIGATION HAND CONTROL SET (6/PK)

## (undated) DEVICE — GLOVE BIOGEL INDICATOR SZ 7.5 SURGICAL PF LTX - (50PR/BX 4BX/CA)

## (undated) DEVICE — DRAPE LARGE 3 QUARTER - (20/CA)

## (undated) DEVICE — SUTURE 1 VICRYL PLUS CTX - 8 X 18 INCH (12/BX)

## (undated) DEVICE — STOCKINET TUBULAR 6IN STERILE - 6 X 48YDS (25/CA)

## (undated) DEVICE — PAD LAP STERILE 18 X 18 - (5/PK 40PK/CA)

## (undated) DEVICE — GLOVE BIOGEL SZ 7 SURGICAL PF LTX - (50PR/BX 4BX/CA)

## (undated) DEVICE — SODIUM CHL. IRRIGATION 0.9% 3000ML (4EA/CA 65CA/PF)

## (undated) DEVICE — MIXER BONE CEMENT REVOLUTION - W/FEMORAL PRESSURIZER (6/CA)

## (undated) DEVICE — GLOVE BIOGEL SZ 7.5 SURGICAL PF LTX - (50PR/BX 4BX/CA)

## (undated) DEVICE — GLOVE BIOGEL SZ 6.5 SURGICAL PF LTX (50PR/BX 4BX/CA)

## (undated) DEVICE — SLEEVE, VASO, THIGH, MED

## (undated) DEVICE — SET LEADWIRE 5 LEAD BEDSIDE DISPOSABLE ECG (1SET OF 5/EA)

## (undated) DEVICE — SET EXTENSION WITH 2 PORTS (48EA/CA) ***PART #2C8610 IS A SUBSTITUTE*****

## (undated) DEVICE — KIT ANESTHESIA W/CIRCUIT & 3/LT BAG W/FILTER (20EA/CA)

## (undated) DEVICE — SUTURE 2-0 MONOCRYL PLUS UNDYED CT-1 1 X 36 (36EA/BX)"

## (undated) DEVICE — SUCTION INSTRUMENT YANKAUER BULBOUS TIP W/O VENT (50EA/CA)

## (undated) DEVICE — HEAD HOLDER JUNIOR/ADULT

## (undated) DEVICE — MASK ANESTHESIA ADULT  - (100/CA)

## (undated) DEVICE — LACTATED RINGERS INJ 1000 ML - (14EA/CA 60CA/PF)

## (undated) DEVICE — LENS/HOOD FOR SPACESUIT - (32/PK) PEEL AWAY FACE

## (undated) DEVICE — SUTURE 3-0 MONOCRYL PLUS PS-1 - 27 INCH (36/BX)

## (undated) DEVICE — SUTURE GENERAL

## (undated) DEVICE — CHLORAPREP 26 ML APPLICATOR - ORANGE TINT(25/CA)

## (undated) DEVICE — GOWN WARMING STANDARD FLEX - (30/CA)

## (undated) DEVICE — TUBING CLEARLINK DUO-VENT - C-FLO (48EA/CA)

## (undated) DEVICE — KIT ROOM DECONTAMINATION

## (undated) DEVICE — PACK TOTAL KNEE  (1/CA)

## (undated) DEVICE — GLOVE BIOGEL SZ 8 SURGICAL PF LTX - (50PR/BX 4BX/CA)

## (undated) DEVICE — GLOVE BIOGEL INDICATOR SZ 6.5 SURGICAL PF LTX - (50PR/BX 4BX/CA)

## (undated) DEVICE — NEPTUNE 4 PORT MANIFOLD - (20/PK)

## (undated) DEVICE — BLADE RECIP 77.5 X 11.2 X .76MM (1/EA)

## (undated) DEVICE — TIP INTPLS HFLO ML ORFC BTRY - (12/CS)  FOR SURGILAV

## (undated) DEVICE — TOURNIQUET CUFF 34 X 4 ONE PORT DISP - STERILE (10/BX)

## (undated) DEVICE — ELECTRODE 850 FOAM ADHESIVE - HYDROGEL RADIOTRNSPRNT (50/PK)

## (undated) DEVICE — MASK, LARYNGEAL AIRWAY #4

## (undated) DEVICE — DERMABOND ADVANCED - (12EA/BX)